# Patient Record
Sex: MALE | Race: WHITE | NOT HISPANIC OR LATINO | Employment: UNEMPLOYED | URBAN - METROPOLITAN AREA
[De-identification: names, ages, dates, MRNs, and addresses within clinical notes are randomized per-mention and may not be internally consistent; named-entity substitution may affect disease eponyms.]

---

## 2018-01-01 ENCOUNTER — HOSPITAL ENCOUNTER (INPATIENT)
Facility: HOSPITAL | Age: 28
LOS: 2 days | Discharge: NURSING, REHAB OR LONG-TERM CARE | DRG: 882 | End: 2018-01-03
Attending: PSYCHIATRY & NEUROLOGY | Admitting: PSYCHIATRY & NEUROLOGY
Payer: MEDICAID

## 2018-01-01 DIAGNOSIS — F33.2 SEVERE EPISODE OF RECURRENT MAJOR DEPRESSIVE DISORDER, WITHOUT PSYCHOTIC FEATURES: ICD-10-CM

## 2018-01-01 DIAGNOSIS — F41.1 GAD (GENERALIZED ANXIETY DISORDER): ICD-10-CM

## 2018-01-01 DIAGNOSIS — F11.20 OPIATE DEPENDENCE, CONTINUOUS: ICD-10-CM

## 2018-01-01 DIAGNOSIS — F43.23 ADJUSTMENT DISORDER WITH MIXED ANXIETY AND DEPRESSED MOOD: Primary | ICD-10-CM

## 2018-01-01 DIAGNOSIS — F32.A DEPRESSION: ICD-10-CM

## 2018-01-01 LAB
CHOLEST SERPL-MCNC: 170 MG/DL
CHOLEST/HDLC SERPL: 4.6 {RATIO}
HDLC SERPL-MCNC: 37 MG/DL
HDLC SERPL: 21.8 %
LDLC SERPL CALC-MCNC: 117.4 MG/DL
NONHDLC SERPL-MCNC: 133 MG/DL
TRIGL SERPL-MCNC: 78 MG/DL

## 2018-01-01 PROCEDURE — 36415 COLL VENOUS BLD VENIPUNCTURE: CPT

## 2018-01-01 PROCEDURE — 80061 LIPID PANEL: CPT

## 2018-01-01 PROCEDURE — 11400000 HC PSYCH PRIVATE ROOM

## 2018-01-01 PROCEDURE — 25000003 PHARM REV CODE 250: Performed by: PSYCHIATRY & NEUROLOGY

## 2018-01-01 PROCEDURE — S4991 NICOTINE PATCH NONLEGEND: HCPCS | Performed by: PSYCHIATRY & NEUROLOGY

## 2018-01-01 PROCEDURE — 99232 SBSQ HOSP IP/OBS MODERATE 35: CPT | Mod: ,,, | Performed by: FAMILY MEDICINE

## 2018-01-01 PROCEDURE — 90833 PSYTX W PT W E/M 30 MIN: CPT | Mod: AF,HB,, | Performed by: PSYCHIATRY & NEUROLOGY

## 2018-01-01 PROCEDURE — 99223 1ST HOSP IP/OBS HIGH 75: CPT | Mod: AF,HB,, | Performed by: PSYCHIATRY & NEUROLOGY

## 2018-01-01 RX ORDER — TRAMADOL HYDROCHLORIDE 50 MG/1
100 TABLET ORAL
Status: DISCONTINUED | OUTPATIENT
Start: 2018-01-01 | End: 2018-01-02

## 2018-01-01 RX ORDER — TRAMADOL HYDROCHLORIDE 50 MG/1
100 TABLET ORAL ONCE
Status: DISCONTINUED | OUTPATIENT
Start: 2018-01-01 | End: 2018-01-01

## 2018-01-01 RX ORDER — TRAMADOL HYDROCHLORIDE 50 MG/1
150 TABLET ORAL
Status: DISCONTINUED | OUTPATIENT
Start: 2018-01-01 | End: 2018-01-01

## 2018-01-01 RX ORDER — TRAMADOL HYDROCHLORIDE 50 MG/1
50 TABLET ORAL ONCE
Status: COMPLETED | OUTPATIENT
Start: 2018-01-01 | End: 2018-01-01

## 2018-01-01 RX ORDER — TRAMADOL HYDROCHLORIDE 50 MG/1
100 TABLET ORAL ONCE
Status: COMPLETED | OUTPATIENT
Start: 2018-01-01 | End: 2018-01-01

## 2018-01-01 RX ORDER — POTASSIUM CHLORIDE 20 MEQ/1
20 TABLET, EXTENDED RELEASE ORAL
Status: COMPLETED | OUTPATIENT
Start: 2018-01-01 | End: 2018-01-01

## 2018-01-01 RX ORDER — LOPERAMIDE HYDROCHLORIDE 2 MG/1
2 CAPSULE ORAL 4 TIMES DAILY PRN
Status: DISCONTINUED | OUTPATIENT
Start: 2018-01-01 | End: 2018-01-03 | Stop reason: HOSPADM

## 2018-01-01 RX ORDER — IBUPROFEN 400 MG/1
400 TABLET ORAL EVERY 6 HOURS PRN
Status: DISCONTINUED | OUTPATIENT
Start: 2018-01-01 | End: 2018-01-03 | Stop reason: HOSPADM

## 2018-01-01 RX ORDER — DIAZEPAM 10 MG/1
10 TABLET ORAL ONCE
Status: COMPLETED | OUTPATIENT
Start: 2018-01-01 | End: 2018-01-01

## 2018-01-01 RX ORDER — DIAZEPAM 10 MG/1
10 TABLET ORAL 3 TIMES DAILY
Status: DISCONTINUED | OUTPATIENT
Start: 2018-01-01 | End: 2018-01-02

## 2018-01-01 RX ORDER — ONDANSETRON 4 MG/1
4 TABLET, ORALLY DISINTEGRATING ORAL EVERY 6 HOURS PRN
Status: DISCONTINUED | OUTPATIENT
Start: 2018-01-01 | End: 2018-01-03 | Stop reason: HOSPADM

## 2018-01-01 RX ORDER — DIAZEPAM 10 MG/1
10 TABLET ORAL ONCE
Status: DISCONTINUED | OUTPATIENT
Start: 2018-01-01 | End: 2018-01-01

## 2018-01-01 RX ORDER — IBUPROFEN 200 MG
1 TABLET ORAL DAILY
Status: DISCONTINUED | OUTPATIENT
Start: 2018-01-01 | End: 2018-01-03 | Stop reason: HOSPADM

## 2018-01-01 RX ADMIN — TRAMADOL HYDROCHLORIDE 50 MG: 50 TABLET, FILM COATED ORAL at 05:01

## 2018-01-01 RX ADMIN — TRAMADOL HYDROCHLORIDE 100 MG: 50 TABLET, FILM COATED ORAL at 11:01

## 2018-01-01 RX ADMIN — TRAMADOL HYDROCHLORIDE 100 MG: 50 TABLET, FILM COATED ORAL at 04:01

## 2018-01-01 RX ADMIN — POTASSIUM CHLORIDE 20 MEQ: 1500 TABLET, EXTENDED RELEASE ORAL at 10:01

## 2018-01-01 RX ADMIN — DIAZEPAM 10 MG: 10 TABLET ORAL at 09:01

## 2018-01-01 RX ADMIN — TRAMADOL HYDROCHLORIDE 100 MG: 50 TABLET, FILM COATED ORAL at 07:01

## 2018-01-01 RX ADMIN — IBUPROFEN 400 MG: 400 TABLET, FILM COATED ORAL at 06:01

## 2018-01-01 RX ADMIN — DIAZEPAM 10 MG: 10 TABLET ORAL at 07:01

## 2018-01-01 RX ADMIN — ONDANSETRON 4 MG: 4 TABLET, ORALLY DISINTEGRATING ORAL at 05:01

## 2018-01-01 RX ADMIN — NICOTINE 1 PATCH: 14 PATCH, EXTENDED RELEASE TRANSDERMAL at 09:01

## 2018-01-01 RX ADMIN — DIAZEPAM 10 MG: 10 TABLET ORAL at 01:01

## 2018-01-01 RX ADMIN — ONDANSETRON 4 MG: 4 TABLET, ORALLY DISINTEGRATING ORAL at 10:01

## 2018-01-01 RX ADMIN — TRAMADOL HYDROCHLORIDE 100 MG: 50 TABLET, FILM COATED ORAL at 08:01
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
## 2018-01-01 NOTE — PLAN OF CARE
Problem: Patient Care Overview (Adult)  Goal: Plan of Care Review  Outcome: Ongoing (interventions implemented as appropriate)  Pt. Has slept 2 hours this shift without problems noted. q 15 min safety checks done this shift.

## 2018-01-01 NOTE — NURSING
Pt up to unit per stretcher, accompanied by security and [facility].  Wanded and ambulated onto unit
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
## 2018-01-01 NOTE — NURSING
Report received from Neeta at [facility].  Pt is 26 yo in on PEC for depression with suicidal ideations and trying to get off heroin.  Currently denies SI but is depressed and does not feel safe if he was discharged.  Acting anxious and suspicious.  Received ativan, zofran and clonidine earlier and is currently sleeping.  Awaiting transfer to unit

## 2018-01-01 NOTE — CONSULTS
History & Physical      SUBJECTIVE:     History of Present Illness:  Patient is a 27 y.o. male presents with substance abuse. Admitted to [facility].    No past medical history other than psych. No complaints today.    No prescriptions prior to admission.       Review of patient's allergies indicates:   Allergen Reactions    Ampicillin Hives    Penicillins Hives       Past Medical History:   Diagnosis Date    Addiction to drug     ADHD (attention deficit hyperactivity disorder)     History of psychiatric hospitalization     [facility] for [facility]hab    Substance abuse     Withdrawal symptoms, drug or narcotic      Past Surgical History:   Procedure Laterality Date    INNER EAR SURGERY       History reviewed. No pertinent family history.  Social History   Substance Use Topics    Smoking status: Current Every Day Smoker     Packs/day: 2.00     Years: 10.00     Types: Cigarettes    Smokeless tobacco: Never Used    Alcohol use No        Review of Systems:  Constitutional: no fever or chills  Respiratory: no cough or shorness of breath  Cardiovascular: no chest pain or palpitations  Gastrointestinal: no nausea or vomiting, no abdominal pain or change in bowel habits  Musculoskeletal: no arthralgias or myalgias  Psych:Opiate abuse  OBJECTIVE:     Vital Signs (Most Recent)  Temp: 99 °F (37.2 °C) (01/01/18 0109)  Pulse: 75 (01/01/18 0109)  Resp: 16 (01/01/18 0109)  BP: 118/64 (01/01/18 0109)    Physical Exam:  General: well developed, well nourished  Lungs:  clear to auscultation bilaterally and normal respiratory effort  Cardiovascular: Heart: regular rate and rhythm, S1, S2 normal, no murmur, click, rub or gallop. Chest Wall: no tenderness. Extremities: no cyanosis or edema, or clubbing. Pulses: 2+ and symmetric.  Abdomen/Rectal: Abdomen: soft, non-tender non-distented; bowel sounds normal; no masses,  no organomegaly. Rectal: not examined  Skin: Skin color, texture, turgor normal. No rashes or  lesions  Musculoskeletal:normal gait  Psych:Quiet  Neuro: Cranial nerves:  CN II Visual fields full to confrontation.   CN III, IV, VI Pupils are equal, round, and reactive to light.  CN III: no palsy  Nystagmus: none   Diplopia: none  Ophthalmoparesis: none  CN V Facial sensation intact.   CN VII Facial expression full, symmetric.   CN VIII normal.   CN IX normal.   CN X normal.   CN XI normal.   CN XII normal.        Laboratory  CBC:   Recent Labs  Lab 12/31/17  1845   WBC 13.88*   RBC 5.04   HGB 15.4   HCT 45.5   *   MCV 90   MCH 30.6   MCHC 33.8     CMP:   Recent Labs  Lab 12/31/17  1845      CALCIUM 10.3   ALBUMIN 5.4*   PROT 9.6*      K 3.3*   CO2 31*   CL 97   BUN 18   CREATININE 0.8   ALKPHOS 69   ALT 39   AST 28   BILITOT 0.8       Recent Labs  Lab 12/31/17  1852   COLORU Yellow   SPECGRAV 1.025   PHUR 6.0   PROTEINUA 1+*   BACTERIA None   NITRITE Negative   LEUKOCYTESUR Negative   UROBILINOGEN 4.0-6.0*   HYALINECASTS 1     TSH   Date Value Ref Range Status   12/31/2017 0.430 0.400 - 4.000 uIU/mL Final     No results found for this or any previous visit.  Alcohol, Medical, Serum   Date Value Ref Range Status   12/31/2017 <5 <10 mg/dL Final     Acetaminophen (Tylenol), Serum   Date Value Ref Range Status   12/31/2017 <10.0 (L) 10.0 - 20.0 ug/mL Final     Comment:     Toxic Levels:  Adults (4 hr post-ingestion).........>150 ug/mL  Adults (12 hr post-ingestion)........>40 ug/mL  Peds (2 hr post-ingestion, liquid)...>225 ug/mL       No results found for this or any previous visit.  Results for orders placed or performed during the hospital encounter of 12/31/17   Drug screen panel, emergency   Result Value Ref Range    Benzodiazepines Negative     Cocaine (Metab.) Negative     Opiate Scrn, Ur Presumptive Positive     Barbiturate Screen, Ur Negative     Amphetamine Screen, Ur Negative     THC Presumptive Positive     Phencyclidine Negative     Creatinine, Random Ur 307.8 23.0 - 375.0 mg/dL     Toxicology Information SEE COMMENT      No results found for: PREGTESTUR    ASSESSMENT/PLAN:     There are no hospital problems to display for this patient.      Plan: Further orders for psych

## 2018-01-01 NOTE — PLAN OF CARE
Problem: Patient Care Overview (Adult)  Goal: Plan of Care Review  Outcome: Ongoing (interventions implemented as appropriate)  Plan of care reviewed with patient, patient in agreement with plan. Denies suicidal ideations and homicidal ideations.  Accepts meals and snacks.  Compliant with medications.  Experiencing some detox symptoms, nausea, and some aches to arms and legs.  Gait steady, no falls.  Clear pathways and clutter-free environment maintained.  Promoted an individualized safety plan, effective coping skills, a drug-free lifestyle, and motivators to improve mood and resolve depression. Will continue to monitor for safety.

## 2018-01-01 NOTE — NURSING
"Pt restless, up to nurses station often complaining of pain, crying saying he wants to call his brother, wanting to get out and get suboxone.  Needs constant redirection.  Changing story saying that he was in group home for 4 years and did heroin the whole time he was there.  On admit said he was only in group home for 1-2 weeks.  Says he needs to get out bc his mom is having open heart surgery today.  Says he lied to get in here, his sister told him to say he was suicidal.  Says "I was never suicidal"  Received Tramadol 50mg po per order.  Pt took, but said it was not going to help.  "

## 2018-01-02 PROBLEM — F43.23 ADJUSTMENT DISORDER WITH MIXED ANXIETY AND DEPRESSED MOOD: Status: ACTIVE | Noted: 2018-01-01

## 2018-01-02 PROBLEM — F32.A DEPRESSION: Status: RESOLVED | Noted: 2018-01-01 | Resolved: 2018-01-02

## 2018-01-02 LAB
ALBUMIN SERPL BCP-MCNC: 4.8 G/DL
ALP SERPL-CCNC: 77 U/L
ALT SERPL W/O P-5'-P-CCNC: 29 U/L
ANION GAP SERPL CALC-SCNC: 11 MMOL/L
AST SERPL-CCNC: 18 U/L
BILIRUB SERPL-MCNC: 0.9 MG/DL
BUN SERPL-MCNC: 16 MG/DL
CALCIUM SERPL-MCNC: 9.9 MG/DL
CHLORIDE SERPL-SCNC: 104 MMOL/L
CO2 SERPL-SCNC: 26 MMOL/L
CREAT SERPL-MCNC: 1.1 MG/DL
EST. GFR  (AFRICAN AMERICAN): >60 ML/MIN/1.73 M^2
EST. GFR  (NON AFRICAN AMERICAN): >60 ML/MIN/1.73 M^2
GLUCOSE SERPL-MCNC: 121 MG/DL
GLUCOSE SERPL-MCNC: 121 MG/DL
POTASSIUM SERPL-SCNC: 3.6 MMOL/L
PROT SERPL-MCNC: 9 G/DL
SODIUM SERPL-SCNC: 141 MMOL/L

## 2018-01-02 PROCEDURE — 25000003 PHARM REV CODE 250: Performed by: PSYCHIATRY & NEUROLOGY

## 2018-01-02 PROCEDURE — 11400000 HC PSYCH PRIVATE ROOM

## 2018-01-02 PROCEDURE — 99233 SBSQ HOSP IP/OBS HIGH 50: CPT | Mod: AF,HB,, | Performed by: PSYCHIATRY & NEUROLOGY

## 2018-01-02 PROCEDURE — S4991 NICOTINE PATCH NONLEGEND: HCPCS | Performed by: PSYCHIATRY & NEUROLOGY

## 2018-01-02 PROCEDURE — 36415 COLL VENOUS BLD VENIPUNCTURE: CPT

## 2018-01-02 PROCEDURE — 80053 COMPREHEN METABOLIC PANEL: CPT

## 2018-01-02 PROCEDURE — 82947 ASSAY GLUCOSE BLOOD QUANT: CPT

## 2018-01-02 PROCEDURE — 97802 MEDICAL NUTRITION INDIV IN: CPT

## 2018-01-02 PROCEDURE — 90833 PSYTX W PT W E/M 30 MIN: CPT | Mod: AF,HB,, | Performed by: PSYCHIATRY & NEUROLOGY

## 2018-01-02 RX ORDER — TRAMADOL HYDROCHLORIDE 50 MG/1
50 TABLET ORAL 2 TIMES DAILY
Status: DISCONTINUED | OUTPATIENT
Start: 2018-01-02 | End: 2018-01-03 | Stop reason: HOSPADM

## 2018-01-02 RX ORDER — DIAZEPAM 5 MG/1
5 TABLET ORAL 2 TIMES DAILY
Status: DISCONTINUED | OUTPATIENT
Start: 2018-01-02 | End: 2018-01-03 | Stop reason: HOSPADM

## 2018-01-02 RX ORDER — IBUPROFEN 200 MG
1 TABLET ORAL DAILY
Refills: 0 | COMMUNITY
Start: 2018-01-03 | End: 2018-02-21 | Stop reason: ALTCHOICE

## 2018-01-02 RX ORDER — HYDROXYZINE PAMOATE 50 MG/1
50 CAPSULE ORAL EVERY 8 HOURS PRN
Status: DISCONTINUED | OUTPATIENT
Start: 2018-01-02 | End: 2018-01-03 | Stop reason: HOSPADM

## 2018-01-02 RX ADMIN — IBUPROFEN 400 MG: 400 TABLET, FILM COATED ORAL at 04:01

## 2018-01-02 RX ADMIN — DIAZEPAM 5 MG: 5 TABLET ORAL at 08:01

## 2018-01-02 RX ADMIN — HYDROXYZINE PAMOATE 50 MG: 50 CAPSULE ORAL at 12:01

## 2018-01-02 RX ADMIN — ONDANSETRON 4 MG: 4 TABLET, ORALLY DISINTEGRATING ORAL at 05:01

## 2018-01-02 RX ADMIN — TRAMADOL HYDROCHLORIDE 100 MG: 50 TABLET, FILM COATED ORAL at 08:01

## 2018-01-02 RX ADMIN — ONDANSETRON 4 MG: 4 TABLET, ORALLY DISINTEGRATING ORAL at 08:01

## 2018-01-02 RX ADMIN — DIAZEPAM 10 MG: 10 TABLET ORAL at 05:01

## 2018-01-02 RX ADMIN — NICOTINE 1 PATCH: 14 PATCH, EXTENDED RELEASE TRANSDERMAL at 08:01

## 2018-01-02 RX ADMIN — ONDANSETRON 4 MG: 4 TABLET, ORALLY DISINTEGRATING ORAL at 01:01

## 2018-01-02 RX ADMIN — IBUPROFEN 400 MG: 400 TABLET, FILM COATED ORAL at 05:01

## 2018-01-02 RX ADMIN — HYDROXYZINE PAMOATE 50 MG: 50 CAPSULE ORAL at 08:01

## 2018-01-02 RX ADMIN — TRAMADOL HYDROCHLORIDE 50 MG: 50 TABLET, FILM COATED ORAL at 08:01

## 2018-01-02 NOTE — PLAN OF CARE
Problem: Overarching Goals (Adult)  Goal: Optimized Coping Skills in Response to Life Stressors    Intervention: Promote Effective Coping Strategies  Group Note    Behavior:  Patient attended Psychotherapy Group late, but participated. Patient complained it was too cold in the room (it was) and his bones hurt.     Intervention:  My Support Map - discussed the importance of healthy relationships, identified different types of relationships and attributes of supportive relationships. Patients completed support map and safety plan.     Response:  Patient states he is going to rehab and has the support of his family. Patient acknowledged he doesn't have safe friends for support.     Plan:  Will continue to encourage patient participation in group.

## 2018-01-02 NOTE — PSYCH
I spoke with the patient about discharge plans. The patient expressed an interest in going into a recovery program. I shared with the patient that we could apply for entrance into a program if he would sign a consent form. The patient signed a consent form to release information to [facility] in [place], [place]. I also spoke to the patient's father who will bring clothing and other items to him during the 5:30 visiting time. The patient's father also stated that he would contact the patient's .

## 2018-01-02 NOTE — PLAN OF CARE
"Problem: Patient Care Overview (Adult)  Goal: Plan of Care Review  Outcome: Ongoing (interventions implemented as appropriate)  Pt is restless and anxious but cooperative.  Pt is discharge focused and manipulative.  Pt informed during treatment team that he would be scheduled to discharge tomorrow if his behavior was appropriate, pt verbalized understanding.  Pt denies any S/I or H/I and reports that he was never suicidal, "I just said that because my sister told me I had to say it to get treatment for detox".  Encouraged pt to continue complying with treatment to work toward discharging tomorrow.  Pt verbalized understanding, will continue to monitor.      "

## 2018-01-02 NOTE — PLAN OF CARE
"  Treatment Recommendation:   1:1 Intervention (as needed)    Cognitive Stimulation Skilled Activity  Creative Expression Skilled Activity  Self Expression Skilled Activity  Mild Exercises Skilled Activity  Stress Management Skilled Activity  Coping Skilled Activity  Leisure Education and Awareness Skilled Activity    Treatment Goal(s):  Long Term Goals Refer To Master Treatment Plan    Short Term Treatment Goal(s)  Patient Will:  Exhibit Improvement in Mood  Demonstrate Constructive Expression of Feelings and Behavior  Identify (+) Leisure / Recreation Activities that Promote Wellness and Promote a Sober Lifestyle    Discharge Recommendations:  Encourage Patient to Utilize Coping Skills on a Regular Basis to Reduce the Risk of Decompensating and Re-Hospitalizations  AA/NA Meetings  Follow Up with After Care Appointments  Continue with Current Leisure Activities     Patient presents with anxious affect and "not good, feeling depressed, my bones hurt" mood. Patient states his admit is due to "doing heroin. I made a mistake. I said I was suicidal but I just want to detox." Patient admits to negative leisure lifestyle of heroin, marijuana and cigarettes. Patient reports he is single, has no children, 11th grade special education(needs help with reading), unemployed, lives with his grandmother in [place], [place]. Patient verbalized main goal is to go to rehab for drug use.  "

## 2018-01-02 NOTE — PROGRESS NOTES
"PSYCHIATRY DAILY INPATIENT PROGRESS NOTE  SUBSEQUENT HOSPITAL VISIT    ENCOUNTER DATE: 1/2/2018  SITE: [facility]    DATE OF ADMISSION: 1/1/2018 12:31 AM  LENGTH OF STAY: 1 days      HISTORY    CHIEF COMPLAINT   Roland Rust is a 27 y.o. male, seen during daily marie rounds on the inpatient unit.  Roland Rust presents with the chief complaint of suicidal ideation and opiate addiction    HPI   (Elements: Location, Quality, Severity, Duration, Timing, Content, Modifying Factors, Associated Signs & Symptoms)    The patient was seen and examined. The chart was reviewed.    Staff reports no behavioral or management issues.     The patient has been compliant with treatment. The patient denied any side effects.    Improving Symptoms of Depression: less diminished mood or loss of interest/anhedonia; no irritability, no diminished energy, less change in sleep, no change in appetite, no diminished concentration or cognition or indecisiveness, no PMA/R, less excessive guilt or hopelessness or worthlessness, no suicidal ideations     Some changes in Sleep: no trouble with initiation, maintenance, or early morning awakening with inability to return to sleep; slept 6.5 hours     Denied Suicidal/Homicidal ideations: no active/passive ideations, organized plans, or future intentions; "I just said that so I could detox."     Denied Symptoms of psychosis: no hallucinations, delusions, disorganized thinking, disorganized behavior or abnormal motor behavior, or negative symptoms      Denies Symptoms of barry or hypomania: no elevated, expansive, or irritable mood with no increased energy or activity; with no inflated self-esteem or grandiosity, decreased need for sleep, increased rate of speech, FOI or racing thoughts, distractibility, increased goal directed activity or PMA,or  risky/disinhibited behavior     Improving Symptoms of CATHY: less excessive anxiety/worry/fear, with no restlessness, fatigue, poor " concentration, irritability, muscle tension, or sleep disturbance     Endorses Substance Use: all of the following intoxication, withdrawal, tolerance, used in larger amounts or duration than intended, unsuccessful attempts to limit or quit, increased time engaging in or seeking out, cravings or strong desire to use, failure to fulfill obligations, negative consequences in social/interpersonal/occupational,/recreational areas, use in dangerous situations, medical or psychological consequences; Uses 7 grams of heroin per day and significant amount of synthetic marijuana.  He uses the marijuana to sleep.      He reports resolved s/s of withdrawal. He is focused on going to rehab as soon as possible.     He was very focused on being discharged as soon as possible. There is concern that he may be minimizing symptoms to secure discharge.        PSYCHOTHERAPY ADD-ON +01973   30 (16-37*) minutes    Time: 16 minutes  Participants: Met with patient    Therapeutic Intervention Type: behavior modifying psychotherapy, supportive psychotherapy  Why chosen therapy is appropriate versus another modality: relevant to diagnosis, patient responds to this modality, evidence based practice    Target symptoms: depression, substance abuse  Primary focus: substance use  Psychotherapeutic techniques: supportive and motivational techniques; psycho-education    Outcome monitoring methods: self-report, observation    Patient's response to intervention:  The patient's response to intervention is accepting.    Progress toward goals:  The patient's progress toward goals is fair .          ROS  General ROS: negative  Ophthalmic ROS: negative  ENT ROS: negative  Allergy and Immunology ROS: negative  Hematological and Lymphatic ROS: negative  Endocrine ROS: negative  Respiratory ROS: no cough, shortness of breath, or wheezing  Cardiovascular ROS: no chest pain or dyspnea on exertion  Gastrointestinal ROS: no abdominal pain, change in bowel habits,  "or black or bloody stools  Genito-Urinary ROS: no dysuria, trouble voiding, or hematuria  Musculoskeletal ROS: negative  Neurological ROS: no TIA or stroke symptoms  Dermatological ROS: negative    PAST MEDICAL HISTORY   Past Medical History:   Diagnosis Date    Addiction to drug     ADHD (attention deficit hyperactivity disorder)     History of psychiatric hospitalization     Shanda Carmona-says for rehab    Substance abuse     Withdrawal symptoms, drug or narcotic            PSYCHOTROPIC MEDICATIONS   Scheduled Meds:   diazePAM  10 mg Oral TID    nicotine  1 patch Transdermal Daily    traMADol  100 mg Oral QID (WM & HS)     Continuous Infusions:  PRN Meds:.ibuprofen, loperamide, ondansetron        EXAMINATION    VITALS   Vitals:    01/01/18 2055   BP: 107/71   Pulse: 62   Resp: 16   Temp: 99.4 °F (37.4 °C)       CONSTITUTIONAL  General Appearance: Tattoos      MUSCULOSKELETAL  Muscle Strength and Tone:  normal  Abnormal Involuntary Movements:  none  Gait and Station:  normal; non-ataxic     PSYCHIATRIC   Level of Consciousness: awake, alert  Orientation: p/p/t/s  Grooming:  adequate to circumstances  Psychomotor Behavior:  Significant PMA/R  Speech: nl r/t/v/s  Language: able to repeat words English fluent  Mood: "better"  Affect: less dysphoric, improving range; improving   Thought Process:  linear and organized  Associations:  intact; no ELISA  Thought Content: no SI denied AVH/delusions; denied HI  Memory:  intact to recent and remote events  Attention:  intact to conversation; not distractible   Fund of Knowledge:  age and education appropriate  Estimate if Intelligence:  average based on work/education history, vocabulary and mental status exam  Insight:  good- seeks help  Judgment:  fair - demanding somewhat cooperative with treatment      DIAGNOSTIC TESTING   Laboratory Results  No results found for this or any previous visit (from the past 24 hour(s)).      ASSESSMENT      IMPRESSION   Major " Depression Disorder Recurrent, Severe, without psychotic features  CATHY  Opiate Use Disorder, severe, continuous, with physiological dependence   Marijuana  Abuse  Nicotine Dependence     MEDICAL DECISION MAKING        PROBLEM LIST AND MANAGEMENT PLANS   Depression - counseling, improving and likely secondary to substance use  Anxiety - counselled, improving and likely secondary to substance use  Opiate Use Disorder - counseling and Tramadol / valium taper  Marijuana Use - counseled  Nicotine Use - replacement and counseled  Hypokalemia - replacement and CMP     PRESCRIPTION DRUG MANAGEMENT  Compliance: yes  Side Effects: no  Regimen Adjustments:   1/1  Tramadol 100mg QID while awake  Valium 10mg TID   Both hold for sedation  Potassium solution    1/2  Decrease Tramadol to 50 mg po BID and Valium to 5 mg po BID     DIAGNOSTIC TESTING  Labs reviewed      Disposition:  -SW to assist with aftercare planning and collateral  -Once stable discharge home with outpatient follow up care and/or rehab  -Continue inpatient treatment under a PEC and/or CEC for danger to self as evidenced by voiced suicidal ideation in severe opiate withdrawal. Patient is improving and may be stable for discharge tomorrow    NEED FOR CONTINUED HOSPITALIZATION  Psychiatric illness continues to pose a potential threat to life or bodily function, of self or others, thereby requiring the need for continued inpatient psychiatric hospitalization: Yes    Protective inpatient pyschiatric hospitalization required while a safe disposition plan is enacted: Yes    Patient stabilized and ready for discharge from inpatient psychiatric unit: No      STAFF:   Magdaleno Jones MD  Psychiatry

## 2018-01-02 NOTE — PLAN OF CARE
Problem: Overarching Goals (Adult)  Goal: Develops/Participates in Therapeutic [place] to Support Successful Transition    Intervention: Mutually Develop Transition Plan  Attempted to contact patients grandmother Mary [phone]. Left voice mail message. Will attempt again later.

## 2018-01-02 NOTE — CONSULTS
"  [facility]  Adult Nutrition  Consult Note    SUMMARY     Recommendations    Recommendation/Intervention: Continue Regular diet as tolerated encouraging good intake  Goals: adequate po intake >=75% by discharge  Nutrition Goal Status: goal met  Communication of RD Recs:  (poc reviewed)    Nutrition Discharge Planning: Regular diet with adequate intake to meet EEN & EPN    Continuum of Care Plan    Referral to Outpatient Services: behavioral health clinic    Reason for Assessment    Reason for Assessment: nurse/nurse practitioner consult  Diagnosis: psychological disorder  Relevent Medical History: Psyc         General Information Comments: Pt admitted with depression, + drug use, marks on knuckles, decreased intake yesterday, today intake is good    Nutrition Prescription Ordered    Current Diet Order: Regular  Nutrition Order Comments: 75% intake      Evaluation of Received Nutrients/Fluid Intake    Energy Calories Required: meeting needs  Protein Required: meeting needs  Fluid Required: meeting needs     Tolerance: tolerating  % Intake of Estimated Energy Needs: 75 - 100 %  % Meal Intake: 75%     Nutrition Risk Screen     Nutrition Risk Screen: no indicators present    Nutrition/Diet History    Patient Reported Diet/Restrictions/Preferences: general     Food Preferences: no cultural or Faith preferences        Factors Affecting Nutritional Intake: depression, socio-economic    Labs/Tests/Procedures/Meds       Pertinent Labs Reviewed: reviewed     Pertinent Medications Reviewed: reviewed       Physical Findings    Overall Physical Appearance: nourished  Tubes:  (-)  Oral/Mouth Cavity: WDL  Skin:  (marks on knuckles)    Anthropometrics    Temp: 98 °F (36.7 °C)     Height: 5' 10" (177.8 cm)  Weight Method:  (rd reviewed)  Weight: 59.4 kg (131 lb)  Ideal Body Weight (IBW), Male: 166 lb     % Ideal Body Weight, Male (lb): 78.92 lb     BMI (Calculated): 18.8  BMI Grade: 18.5-24.9 - " normal    Estimated/Assessed Needs    Weight Used For Calorie Calculations: 59.4 kg (130 lb 15.3 oz)   Height (cm): 177.8 cm  Energy Calorie Requirements (kcal): 2054  Energy Need Method: Sublette-St Jeor (x1.3)     RMR (Sublette-St. Jeor Equation): 1580.25        Weight Used For Protein Calculations: 59.4 kg (130 lb 15.3 oz)  Protein Requirements: 59 (1.0)  1.0 gm Protein (gm): 59.52     Fluid Need Method: RDA Method (1ml/kcal or per MD)  RDA Method (mL): 2054      Assessment and Plan    Nutrition Diagnosis  No Nutrition Diagnosis at this time    Related to (etiology):   Adequate po intake    Signs and Symptoms (as evidenced by):   100% intake at this time     Nutrition Diagnosis Status:   New    Monitor and Evaluation    Food and Nutrient Intake: food and beverage intake  Food and Nutrient Adminstration: diet order  Knowledge/Beliefs/Attitudes: food and nutrition knowledge/skill, beliefs and attitudes  Physical Activity and Function: nutrition-related ADLs and IADLs  Anthropometric Measurements: weight, weight change  Biochemical Data, Medical Tests and Procedures: electrolyte and renal panel  Nutrition-Focused Physical Findings: overall appearance    Nutrition Risk    Level of Risk:  (F/U 1x/wk)    Nutrition Follow-Up    RD Follow-up?: Yes

## 2018-01-02 NOTE — PLAN OF CARE
Problem: Patient Care Overview (Adult)  Goal: Plan of Care Review  Outcome: Ongoing (interventions implemented as appropriate)  Pt has slept 6.5 hours so far uninterrupted.  NAD.  Resp even & unlabored.  Pathways clear and bed is low.  Q 15 minute safety checks ongoing.  All precautions maintained.

## 2018-01-02 NOTE — PLAN OF CARE
Problem: Patient Care Overview (Adult)  Goal: Plan of Care Review  Outcome: Ongoing (interventions implemented as appropriate)  Plan of care reviewed.  Denies intent to harm self or others at this time.  Accepts all meals and medications.  Gait steady, no falls. Somatic, interacts with staff to express self and with peers but on a superficial level. Firm limits were set by male co-worker and pt voiced understanding of such.  States he wants to go home and that he only stated that he was suicidal because his sister told him to do that to get rehab help.  Promoted an individualized safety plan, reality-based interactions, effective coping strategies, and impulse control.  Will continue to monitor for safety.

## 2018-01-02 NOTE — PLAN OF CARE
Problem: Patient Care Overview (Adult)  Goal: Interdisciplinary Rounds/Family Conf  TREATMENT TEAM UPDATE  Pos for opiates and thc         Chief Complaint:  Roland Rust is a 27 y.o. male with a past psychiatric history of Opiate Dependence, currently admitted to the inpatient unit with the following chief complaint: suicidal ideation and opiate addiction  Patient had a positive UTOX for opiates and thc     Current:  I was on heroin. I listened to my stupid sister and said I was suicidal.   Accepted to [facility]. States he wanted to detox and go to rehab.    I'm ready to go home. My grandma said I could detox at her house until calls and has a bed open for me. She's having open heart surgery.    I'm very not suicidal I love my life. Will stop detox meds today. And plan for discharge tomorrow. Will verify with grandmother to confirm and talk with  to help facilitate rehab acceptance. Will also make patient appointment with [facility]  States he last used 4-5 days ago.   Mary  grandmother.     Plan:  D/C to home   [facility]

## 2018-01-02 NOTE — PLAN OF CARE
Problem: Patient Care Overview (Adult)  Goal: Plan of Care Review  Outcome: Ongoing (interventions implemented as appropriate)  Nutrition Goals: adequate po intake >=75% by discharge  Nutrition Goal Status: goal met  Communication of RD Recs:  (poc reviewed)    Nutrition Discharge Planning: Regular diet with adequate intake to meet EEN & EPN    Continuum of Care Plan Referral to Outpatient Services: behavioral health clinic

## 2018-01-03 VITALS
HEIGHT: 70 IN | TEMPERATURE: 99 F | DIASTOLIC BLOOD PRESSURE: 85 MMHG | SYSTOLIC BLOOD PRESSURE: 125 MMHG | HEART RATE: 57 BPM | WEIGHT: 131 LBS | RESPIRATION RATE: 18 BRPM | BODY MASS INDEX: 18.75 KG/M2

## 2018-01-03 PROCEDURE — 25000003 PHARM REV CODE 250: Performed by: PSYCHIATRY & NEUROLOGY

## 2018-01-03 PROCEDURE — 99239 HOSP IP/OBS DSCHRG MGMT >30: CPT | Mod: AF,HB,, | Performed by: PSYCHIATRY & NEUROLOGY

## 2018-01-03 RX ADMIN — DIAZEPAM 5 MG: 5 TABLET ORAL at 08:01

## 2018-01-03 RX ADMIN — IBUPROFEN 400 MG: 400 TABLET, FILM COATED ORAL at 03:01

## 2018-01-03 RX ADMIN — TRAMADOL HYDROCHLORIDE 50 MG: 50 TABLET, FILM COATED ORAL at 08:01

## 2018-01-03 NOTE — NURSING
Pt is being discharged today to go to [facility] at [address] [place] [address] in [place] [address].  Phone number is .  Fax number is .  Pt is a current every day smoker and has received counseling on tobacco cessation while on U and will continue to receive counseling on smoking cessation at the next level of care which is [facility].  Pt will also receive counseling for drug addiction while at [facility].  Instructed pt that a nicotine patch is available over the counter at any pharmacy of choice to aid in smoking cessation.  No prescription is required.  Pt verbalized understanding.  Pt's belongings are packed and are in the nurses station  and will be sent with pt upon discharge.  Improved mood and affect.  Denies SI/HI and has maintained a reality based orientation while on U.  Copy of AVS has been faxed to [facility] at  on 1/3/18 at  0430 and will be sent with pt upon discharge.  Pt will be discharged this AM approximately at 0830.  [facility] Service will transport pt to [facility] upon pt's discharge as previously arranged.

## 2018-01-03 NOTE — DISCHARGE INSTRUCTIONS
You are being discharged on 1/3/17 to attend [facility] at [address] [place] [address] [place] [address].  The phone number is .  Fax number is .  Transportation will be provided by [facility] as previously arranged and you will leave here approximately at 0830 to travel to [place].  If you begin to have suicidal thoughts, call 911 or go to the nearest emergency room for help.  Good luck to you in your endeavor to stop taking illegal drugs.

## 2018-01-03 NOTE — PLAN OF CARE
Lying quiet in bed, eyes closed, respiration even and unlabored, appearing asleep.  Slept well up until about 0230.  Then came out of his room requesting pain med and was given ibuprofen at 0308.  While waiting for med to be pulled pt tried pushing limits about taking a shower.  Insisted that a shower was the only thing that would give him relief.  Encouraged him to follow the rules and to allow the ibuprofen to work instead of being pessimistic.  Pt did concede and went lie down.  Noted back to sleep by 0500.  Safety and precautions maintained with rounds every 15 minutes, bed is fixed in a low position and pathways kept clear.  No fall occurred.

## 2018-01-03 NOTE — PROGRESS NOTES
PSYCHOTHERAPY ADD-ON +[id number]   30 (16-37*) minutes     Time: 16 minutes  Participants: Met with patient     Therapeutic Intervention Type: behavior modifying psychotherapy, supportive psychotherapy  Why chosen therapy is appropriate versus another modality: relevant to diagnosis, patient responds to this modality, evidence based practice     Target symptoms: depression, substance abuse  Primary focus: substance use  Psychotherapeutic techniques: supportive and motivational techniques; psycho-education; managing cravings, safety planning     Outcome monitoring methods: self-report, observation     Patient's response to intervention:  The patient's response to intervention is accepting.     Progress toward goals:  The patient's progress toward goals is good.    Magdaleno Jones MD  Psychiatry

## 2018-01-03 NOTE — PSYCH
Patient will be following up with [facility] at [address] [place] [address] in [place], [phone].  Patient was picked up here by [facility] at 8:20 am.  He will receive tobacco cessation therapy and substance abuse therapy while in treatment due to a positive UDS on admit.  AVS faxed on 1/3/2018 at 9:11 am.
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
## 2018-02-23 PROBLEM — F22 PARANOID: Status: ACTIVE | Noted: 2018-02-23

## 2018-03-05 PROBLEM — F23 ACUTE PSYCHOSIS: Status: ACTIVE | Noted: 2018-03-05

## 2018-03-19 ENCOUNTER — HOSPITAL ENCOUNTER (INPATIENT)
Facility: HOSPITAL | Age: 28
LOS: 3 days | Discharge: HOME | DRG: 885 | End: 2018-03-22
Attending: PSYCHIATRY & NEUROLOGY | Admitting: PSYCHIATRY & NEUROLOGY
Payer: MEDICAID

## 2018-03-19 DIAGNOSIS — E87.6 HYPOKALEMIA: ICD-10-CM

## 2018-03-19 DIAGNOSIS — F17.210 CIGARETTE NICOTINE DEPENDENCE WITHOUT COMPLICATION: ICD-10-CM

## 2018-03-19 DIAGNOSIS — F29 PSYCHOSIS: ICD-10-CM

## 2018-03-19 DIAGNOSIS — F29 PSYCHOSIS, UNSPECIFIED PSYCHOSIS TYPE: ICD-10-CM

## 2018-03-19 DIAGNOSIS — F33.2 SEVERE EPISODE OF RECURRENT MAJOR DEPRESSIVE DISORDER, WITHOUT PSYCHOTIC FEATURES: Primary | ICD-10-CM

## 2018-03-19 PROCEDURE — 11400000 HC PSYCH PRIVATE ROOM

## 2018-03-20 PROBLEM — F17.210 CIGARETTE NICOTINE DEPENDENCE WITHOUT COMPLICATION: Status: ACTIVE | Noted: 2018-03-20

## 2018-03-20 PROBLEM — E87.6 HYPOKALEMIA: Status: ACTIVE | Noted: 2018-03-20

## 2018-03-20 PROBLEM — F29 PSYCHOSIS: Status: ACTIVE | Noted: 2018-03-20

## 2018-03-20 LAB — GLUCOSE SERPL-MCNC: 99 MG/DL

## 2018-03-20 PROCEDURE — 25000003 PHARM REV CODE 250: Performed by: NURSE PRACTITIONER

## 2018-03-20 PROCEDURE — 97802 MEDICAL NUTRITION INDIV IN: CPT

## 2018-03-20 PROCEDURE — 11400000 HC PSYCH PRIVATE ROOM

## 2018-03-20 PROCEDURE — 82947 ASSAY GLUCOSE BLOOD QUANT: CPT

## 2018-03-20 PROCEDURE — 25000003 PHARM REV CODE 250: Performed by: PSYCHIATRY & NEUROLOGY

## 2018-03-20 PROCEDURE — 99222 1ST HOSP IP/OBS MODERATE 55: CPT | Mod: ,,, | Performed by: NURSE PRACTITIONER

## 2018-03-20 PROCEDURE — 36415 COLL VENOUS BLD VENIPUNCTURE: CPT

## 2018-03-20 RX ORDER — OLANZAPINE 10 MG/2ML
10 INJECTION, POWDER, FOR SOLUTION INTRAMUSCULAR EVERY 4 HOURS PRN
Status: DISCONTINUED | OUTPATIENT
Start: 2018-03-20 | End: 2018-03-20

## 2018-03-20 RX ORDER — FOLIC ACID 1 MG/1
1 TABLET ORAL DAILY
Status: DISCONTINUED | OUTPATIENT
Start: 2018-03-20 | End: 2018-03-22 | Stop reason: HOSPADM

## 2018-03-20 RX ORDER — IBUPROFEN 200 MG
1 TABLET ORAL DAILY PRN
Status: DISCONTINUED | OUTPATIENT
Start: 2018-03-20 | End: 2018-03-22 | Stop reason: HOSPADM

## 2018-03-20 RX ORDER — ACETAMINOPHEN 325 MG/1
650 TABLET ORAL EVERY 6 HOURS PRN
Status: DISCONTINUED | OUTPATIENT
Start: 2018-03-20 | End: 2018-03-22 | Stop reason: HOSPADM

## 2018-03-20 RX ORDER — IBUPROFEN 200 MG
1 TABLET ORAL DAILY
Status: DISCONTINUED | OUTPATIENT
Start: 2018-03-20 | End: 2018-03-20

## 2018-03-20 RX ORDER — OLANZAPINE 10 MG/1
10 TABLET ORAL EVERY 4 HOURS PRN
Status: DISCONTINUED | OUTPATIENT
Start: 2018-03-20 | End: 2018-03-20

## 2018-03-20 RX ORDER — FOLIC ACID 1 MG/1
1 TABLET ORAL DAILY
Status: DISCONTINUED | OUTPATIENT
Start: 2018-03-20 | End: 2018-03-20

## 2018-03-20 RX ORDER — HYDROXYZINE PAMOATE 50 MG/1
50 CAPSULE ORAL EVERY 6 HOURS PRN
Status: DISCONTINUED | OUTPATIENT
Start: 2018-03-20 | End: 2018-03-22 | Stop reason: HOSPADM

## 2018-03-20 RX ORDER — QUETIAPINE FUMARATE 200 MG/1
200 TABLET, FILM COATED ORAL DAILY
Status: DISCONTINUED | OUTPATIENT
Start: 2018-03-20 | End: 2018-03-22 | Stop reason: HOSPADM

## 2018-03-20 RX ORDER — OLANZAPINE 10 MG/1
10 TABLET ORAL EVERY 4 HOURS PRN
Status: DISCONTINUED | OUTPATIENT
Start: 2018-03-20 | End: 2018-03-22 | Stop reason: HOSPADM

## 2018-03-20 RX ORDER — POTASSIUM CHLORIDE 20 MEQ/1
40 TABLET, EXTENDED RELEASE ORAL ONCE
Status: COMPLETED | OUTPATIENT
Start: 2018-03-20 | End: 2018-03-20

## 2018-03-20 RX ORDER — IBUPROFEN 600 MG/1
600 TABLET ORAL EVERY 6 HOURS PRN
Status: DISCONTINUED | OUTPATIENT
Start: 2018-03-20 | End: 2018-03-22 | Stop reason: HOSPADM

## 2018-03-20 RX ORDER — IBUPROFEN 200 MG
1 TABLET ORAL DAILY PRN
Status: DISCONTINUED | OUTPATIENT
Start: 2018-03-20 | End: 2018-03-20

## 2018-03-20 RX ORDER — OLANZAPINE 10 MG/2ML
10 INJECTION, POWDER, FOR SOLUTION INTRAMUSCULAR EVERY 4 HOURS PRN
Status: DISCONTINUED | OUTPATIENT
Start: 2018-03-20 | End: 2018-03-22 | Stop reason: HOSPADM

## 2018-03-20 RX ORDER — IBUPROFEN 400 MG/1
400 TABLET ORAL EVERY 6 HOURS PRN
Status: DISCONTINUED | OUTPATIENT
Start: 2018-03-20 | End: 2018-03-20

## 2018-03-20 RX ORDER — QUETIAPINE FUMARATE 300 MG/1
300 TABLET, FILM COATED ORAL NIGHTLY
Status: DISCONTINUED | OUTPATIENT
Start: 2018-03-20 | End: 2018-03-21

## 2018-03-20 RX ADMIN — FOLIC ACID 1 MG: 1 TABLET ORAL at 08:03

## 2018-03-20 RX ADMIN — ACETAMINOPHEN 650 MG: 325 TABLET, FILM COATED ORAL at 05:03

## 2018-03-20 RX ADMIN — POTASSIUM CHLORIDE 40 MEQ: 1500 TABLET, EXTENDED RELEASE ORAL at 09:03

## 2018-03-20 RX ADMIN — IBUPROFEN 600 MG: 600 TABLET, FILM COATED ORAL at 09:03

## 2018-03-20 RX ADMIN — THERA TABS 1 TABLET: TAB at 08:03

## 2018-03-20 RX ADMIN — IBUPROFEN 600 MG: 600 TABLET, FILM COATED ORAL at 10:03

## 2018-03-20 RX ADMIN — QUETIAPINE FUMARATE 300 MG: 300 TABLET, FILM COATED ORAL at 08:03

## 2018-03-20 RX ADMIN — OLANZAPINE 10 MG: 10 TABLET, FILM COATED ORAL at 09:03

## 2018-03-20 RX ADMIN — QUETIAPINE FUMARATE 200 MG: 200 TABLET ORAL at 09:03

## 2018-03-20 NOTE — CONSULTS
[facility] Medicine  Consult Note    Patient Name: Roland Rust  MRN: [id number]  Admission Date: 3/19/2018  Hospital Length of Stay: 1 days  Attending Physician: Magdaleno Estrada MD   Primary Care Provider: Primary Doctor No           Patient information was obtained from patient and ER records.     Inpatient consult to Family Medicine  Consult performed by: FLORA LUNDBERG  Consult ordered by: MAGDALENO ESTRADA        Subjective:     Principal Problem: <principal problem not specified>    Chief Complaint: No chief complaint on file.       HPI:   28 year old male in [facility] ER brought in by police; patient reports that he is sick and tired of being sick and tired; doesn't want to live like this anymore; he reported wanting help for drugs and sponsor recommending him needing rehab.   BP Readings from Last 3 Encounters:   03/20/18 (!) 129/90   03/19/18 132/64   03/10/18 120/66     He denies hx of HTN, Diabetes    Past Medical History:   Diagnosis Date    Acute psychosis 3/5/2018    Addiction to drug     ADHD (attention deficit hyperactivity disorder)     Anxiety     Depression     History of psychiatric hospitalization     Shanda Mathew-says for rehab    Substance abuse     Withdrawal symptoms, drug or narcotic        Past Surgical History:   Procedure Laterality Date    INNER EAR SURGERY         Review of patient's allergies indicates:   Allergen Reactions    Ampicillin Hives    Penicillins Hives       No current facility-administered medications on file prior to encounter.      Current Outpatient Prescriptions on File Prior to Encounter   Medication Sig    nicotine (NICODERM CQ) 21 mg/24 hr Place 1 patch onto the skin once daily.    QUEtiapine (SEROQUEL) 200 MG Tab Take 1 tablet (200 mg total) by mouth once daily.    QUEtiapine (SEROQUEL) 300 MG Tab Take 1 tablet (300 mg total) by mouth every evening.     Family History     Problem Relation (Age of  Onset)    No Known Problems Mother, Father, Brother        Social History Main Topics    Smoking status: Current Every Day Smoker     Packs/day: 1.00     Years: 10.00     Types: Cigarettes     Start date: 1/28/2009    Smokeless tobacco: Never Used    Alcohol use No    Drug use: Yes     Types: Heroin, Methamphetamines      Comment: hx heroin    Sexual activity: Not Currently     Partners: Female     Birth control/ protection: Condom     Review of Systems   Constitutional: Negative for chills and fever.   HENT: Negative for congestion and sore throat.    Respiratory: Negative for cough, chest tightness and shortness of breath.    Cardiovascular: Negative for chest pain, palpitations and leg swelling.   Gastrointestinal: Negative for abdominal pain, diarrhea, nausea and vomiting.   Genitourinary: Negative for flank pain, frequency and hematuria.   Musculoskeletal: Negative for back pain and neck pain.   Skin: Negative for rash and wound.   Neurological: Negative for dizziness, seizures, syncope and headaches.   Psychiatric/Behavioral: Negative for agitation, confusion and self-injury.     Objective:     Vital Signs (Most Recent):  Temp: 97.1 °F (36.2 °C) (03/20/18 0000)  Pulse: 75 (03/20/18 0000)  Resp: 18 (03/20/18 0000)  BP: (!) 129/90 (03/20/18 0000) Vital Signs (24h Range):  Temp:  [97.1 °F (36.2 °C)-99 °F (37.2 °C)] 97.1 °F (36.2 °C)  Pulse:  [] 75  Resp:  [18-20] 18  SpO2:  [100 %] 100 %  BP: (129-140)/(64-90) 129/90     Weight: 59 kg (130 lb)  Body mass index is 19.77 kg/m².    Physical Exam   Constitutional: He is oriented to person, place, and time. He appears well-developed and well-nourished. No distress.   HENT:   Head: Normocephalic and atraumatic.   Eyes: Pupils are equal, round, and reactive to light.   Neck: No thyromegaly present.   Cardiovascular: Normal rate, regular rhythm, normal heart sounds and intact distal pulses.    No murmur heard.  Pulmonary/Chest: Effort normal and breath sounds  normal. No respiratory distress. He has no wheezes. He has no rales.   Abdominal: Soft. Bowel sounds are normal. He exhibits no distension and no mass. There is no tenderness.   Musculoskeletal: Normal range of motion. He exhibits no edema or deformity.   Lymphadenopathy:     He has no cervical adenopathy.   Neurological: He is alert and oriented to person, place, and time.   CN II visual fields full to confrontation  CN III, Iv, VI- pupils ERRL   CN III- no palsy  Nystagmus; none   Diplopia- none  ophthalmoparesis- none  CN V- facial sensation intact  CN VII- facial expression full and symmetric  CNVIII- normal  CN IV-Normal  CN X- normal  CN XI- Normal   CN XII normal      Skin: Skin is warm and dry. No rash noted. No erythema.   multiple neck and face tattoos   Psychiatric:   Appears fidgety. cooperative but a little agitated this am   Nursing note and vitals reviewed.      Significant Labs:   UPT  No results found for this or any previous visit.  U/A  Results for orders placed or performed during the hospital encounter of 03/19/18   Urinalysis   Result Value Ref Range    Specimen UA Urine, Clean Catch     Color, UA Yellow Yellow, Straw, Gretchen    Appearance, UA Cloudy (A) Clear    pH, UA 6.0 5.0 - 8.0    Specific Gravity, UA 1.020 1.005 - 1.030    Protein, UA Negative Negative    Glucose, UA Negative Negative    Ketones, UA Negative Negative    Bilirubin (UA) Negative Negative    Occult Blood UA Negative Negative    Nitrite, UA Negative Negative    Urobilinogen, UA Negative <2.0 EU/dL    Leukocytes, UA Negative Negative     UDS- + amphatamines     CBC  Results for orders placed or performed during the hospital encounter of 03/19/18   CBC auto differential   Result Value Ref Range    WBC 10.51 3.90 - 12.70 K/uL    RBC 4.51 (L) 4.60 - 6.20 M/uL    Hemoglobin 13.9 (L) 14.0 - 18.0 g/dL    Hematocrit 40.8 40.0 - 54.0 %    MCV 91 82 - 98 fL    MCH 30.8 27.0 - 31.0 pg    MCHC 34.1 32.0 - 36.0 g/dL    RDW 12.4 11.5 -  14.5 %    Platelets 364 (H) 150 - 350 K/uL    MPV 11.6 9.2 - 12.9 fL    Gran # (ANC) 6.4 1.8 - 7.7 K/uL    Lymph # 3.1 1.0 - 4.8 K/uL    Mono # 0.8 0.3 - 1.0 K/uL    Eos # 0.1 0.0 - 0.5 K/uL    Baso # 0.11 0.00 - 0.20 K/uL    nRBC 0 0 /100 WBC    Gran% 61.0 38.0 - 73.0 %    Lymph% 29.8 18.0 - 48.0 %    Mono% 7.3 4.0 - 15.0 %    Eosinophil% 0.9 0.0 - 8.0 %    Basophil% 1.0 0.0 - 1.9 %    Differential Method Automated      CMP  Results for orders placed or performed during the hospital encounter of 03/19/18   Comprehensive metabolic panel   Result Value Ref Range    Sodium 141 136 - 145 mmol/L    Potassium 3.4 (L) 3.5 - 5.1 mmol/L    Chloride 100 95 - 110 mmol/L    CO2 27 23 - 29 mmol/L    Glucose 72 70 - 110 mg/dL    BUN, Bld 16 6 - 20 mg/dL    Creatinine 0.9 0.5 - 1.4 mg/dL    Calcium 9.9 8.7 - 10.5 mg/dL    Total Protein 8.7 (H) 6.0 - 8.4 g/dL    Albumin 4.8 3.5 - 5.2 g/dL    Total Bilirubin 0.5 0.1 - 1.0 mg/dL    Alkaline Phosphatase 76 55 - 135 U/L    AST 57 (H) 10 - 40 U/L     (H) 10 - 44 U/L    Anion Gap 14 8 - 16 mmol/L    eGFR if African American >60.0 >60 mL/min/1.73 m^2    eGFR if non African American >60.0 >60 mL/min/1.73 m^2     TSH  Results for orders placed or performed during the hospital encounter of 03/05/18   TSH   Result Value Ref Range    TSH 0.690 0.400 - 4.000 uIU/mL     ETOH  Results for orders placed or performed during the hospital encounter of 03/05/18   Ethanol   Result Value Ref Range    Alcohol, Medical, Serum <5 <10 mg/dL     Salicylate  No results found for this or any previous visit.  Acetaminophen  Results for orders placed or performed during the hospital encounter of 03/05/18   Acetaminophen level   Result Value Ref Range    Acetaminophen (Tylenol), Serum <10.0 (L) 10.0 - 20.0 ug/mL             Significant Imaging: CT: I have reviewed all pertinent results/findings within the past 24 hours and my personal findings are:  No imaging    Assessment/Plan:     Cigarette nicotine  dependence without complication      Nicotine patch        Hypokalemia    KCL 40meq po x 1 today with food           Psychosis      Per psychiatry          VTE Risk Mitigation     None              Thank you for your consult. I will sign off. Please contact us if you have any additional questions.    Carolynn Oliva NP  Department of Hospital Medicine   [facility]
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
## 2018-03-20 NOTE — HPI
28 year old male in [facility] ER brought in by police; patient reports that he is sick and tired of being sick and tired; doesn't want to live like this anymore; he reported wanting help for drugs and sponsor recommending him needing rehab.   BP Readings from Last 3 Encounters:   03/20/18 (!) 129/90   03/19/18 132/64   03/10/18 120/66     He denies hx of HTN, Diabetes

## 2018-03-20 NOTE — PLAN OF CARE
Problem: Patient Care Overview (Adult)  Goal: Plan of Care Review  Recommendations     Recommendation/Intervention: Cont w/ current diet order & encourage adequate meal/snack intake daily.  Goals: Maintain meal intake >/=75%  Nutrition Goal Status: new
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
## 2018-03-20 NOTE — PLAN OF CARE
Problem: Patient Care Overview (Adult)  Goal: Plan of Care Review  Outcome: Ongoing (interventions implemented as appropriate)  Pt in bed majority of shift.Up for some meals.Hygiene/grooming poor.Irritable this am.Recieved prn Zyprexa 10mg po for agitation.Pt calmer since.Taking all medications.

## 2018-03-20 NOTE — PLAN OF CARE
Problem: Patient Care Overview (Adult)  Goal: Plan of Care Review  Outcome: Ongoing (interventions implemented as appropriate)  Plan of care developed and reviewed with patient, patient agrees with plan. Denies suicidal ideations, but feels homicidal when someone makes him angry, but he has no method nor plan in mind.  Accepted meal this evening, reports is taking medication at home.  Gait steady, no falls.  Restless, anxious and depressed. Clear pathways and clutter-free environment maintained.  Promoted an individualized safety plan, effective coping skills, a drug-free lifestyle, and motivators to improve mood and resolve depression and anxiety.  Will continue to monitor every fifteen minutes for safety.
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
## 2018-03-20 NOTE — PLAN OF CARE
Problem: Overarching Goals (Adult)  Goal: Optimized Coping Skills in Response to Life Stressors    Intervention: Promote Effective Coping Strategies  Patient did not attend Psychotherapy Group despite staff encouragement. Patient isolating in room in bed. Will attempt to meet 1:1 with patient later.

## 2018-03-20 NOTE — PLAN OF CARE
Problem: Patient Care Overview (Adult)  Goal: Plan of Care Review  Outcome: Ongoing (interventions implemented as appropriate)  Pt has slept 4 hours so far.  NAD.  Resp even & unlabored.  Pathways clear and bed is low.  Q 15 minute safety checks ongoing.  All precautions maintained.
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
## 2018-03-20 NOTE — SUBJECTIVE & OBJECTIVE
Past Medical History:   Diagnosis Date    Acute psychosis 3/5/2018    Addiction to drug     ADHD (attention deficit hyperactivity disorder)     Anxiety     Depression     History of psychiatric hospitalization     [facility]-says for rehab    Substance abuse     Withdrawal symptoms, drug or narcotic        Past Surgical History:   Procedure Laterality Date    INNER EAR SURGERY         Review of patient's allergies indicates:   Allergen Reactions    Ampicillin Hives    Penicillins Hives       No current facility-administered medications on file prior to encounter.      Current Outpatient Prescriptions on File Prior to Encounter   Medication Sig    nicotine (NICODERM CQ) 21 mg/24 hr Place 1 patch onto the skin once daily.    QUEtiapine (SEROQUEL) 200 MG Tab Take 1 tablet (200 mg total) by mouth once daily.    QUEtiapine (SEROQUEL) 300 MG Tab Take 1 tablet (300 mg total) by mouth every evening.     Family History     Problem Relation (Age of Onset)    No Known Problems Mother, Father, Brother        Social History Main Topics    Smoking status: Current Every Day Smoker     Packs/day: 1.00     Years: 10.00     Types: Cigarettes     Start date: 1/28/2009    Smokeless tobacco: Never Used    Alcohol use No    Drug use: Yes     Types: Heroin, Methamphetamines      Comment: hx heroin    Sexual activity: Not Currently     Partners: Female     Birth control/ protection: Condom     Review of Systems   Constitutional: Negative for chills and fever.   HENT: Negative for congestion and sore throat.    Respiratory: Negative for cough, chest tightness and shortness of breath.    Cardiovascular: Negative for chest pain, palpitations and leg swelling.   Gastrointestinal: Negative for abdominal pain, diarrhea, nausea and vomiting.   Genitourinary: Negative for flank pain, frequency and hematuria.   Musculoskeletal: Negative for back pain and neck pain.   Skin: Negative for rash and wound.   Neurological:  Negative for dizziness, seizures, syncope and headaches.   Psychiatric/Behavioral: Negative for agitation, confusion and self-injury.     Objective:     Vital Signs (Most Recent):  Temp: 97.1 °F (36.2 °C) (03/20/18 0000)  Pulse: 75 (03/20/18 0000)  Resp: 18 (03/20/18 0000)  BP: (!) 129/90 (03/20/18 0000) Vital Signs (24h Range):  Temp:  [97.1 °F (36.2 °C)-99 °F (37.2 °C)] 97.1 °F (36.2 °C)  Pulse:  [] 75  Resp:  [18-20] 18  SpO2:  [100 %] 100 %  BP: (129-140)/(64-90) 129/90     Weight: 59 kg (130 lb)  Body mass index is 19.77 kg/m².    Physical Exam   Constitutional: He is oriented to person, place, and time. He appears well-developed and well-nourished. No distress.   HENT:   Head: Normocephalic and atraumatic.   Eyes: Pupils are equal, round, and reactive to light.   Neck: No thyromegaly present.   Cardiovascular: Normal rate, regular rhythm, normal heart sounds and intact distal pulses.    No murmur heard.  Pulmonary/Chest: Effort normal and breath sounds normal. No respiratory distress. He has no wheezes. He has no rales.   Abdominal: Soft. Bowel sounds are normal. He exhibits no distension and no mass. There is no tenderness.   Musculoskeletal: Normal range of motion. He exhibits no edema or deformity.   Lymphadenopathy:     He has no cervical adenopathy.   Neurological: He is alert and oriented to person, place, and time.   CN II visual fields full to confrontation  CN III, Iv, VI- pupils ERRL   CN III- no palsy  Nystagmus; none   Diplopia- none  ophthalmoparesis- none  CN V- facial sensation intact  CN VII- facial expression full and symmetric  CNVIII- normal  CN IV-Normal  CN X- normal  CN XI- Normal   CN XII normal      Skin: Skin is warm and dry. No rash noted. No erythema.   multiple neck and face tattoos   Psychiatric:   Appears fidgety. cooperative but a little agitated this am   Nursing note and vitals reviewed.      Significant Labs:   UPT  No results found for this or any previous  visit.  U/A  Results for orders placed or performed during the hospital encounter of 03/19/18   Urinalysis   Result Value Ref Range    Specimen UA Urine, Clean Catch     Color, UA Yellow Yellow, Straw, Gretchen    Appearance, UA Cloudy (A) Clear    pH, UA 6.0 5.0 - 8.0    Specific Gravity, UA 1.020 1.005 - 1.030    Protein, UA Negative Negative    Glucose, UA Negative Negative    Ketones, UA Negative Negative    Bilirubin (UA) Negative Negative    Occult Blood UA Negative Negative    Nitrite, UA Negative Negative    Urobilinogen, UA Negative <2.0 EU/dL    Leukocytes, UA Negative Negative     UDS- + amphatamines     CBC  Results for orders placed or performed during the hospital encounter of 03/19/18   CBC auto differential   Result Value Ref Range    WBC 10.51 3.90 - 12.70 K/uL    RBC 4.51 (L) 4.60 - 6.20 M/uL    Hemoglobin 13.9 (L) 14.0 - 18.0 g/dL    Hematocrit 40.8 40.0 - 54.0 %    MCV 91 82 - 98 fL    MCH 30.8 27.0 - 31.0 pg    MCHC 34.1 32.0 - 36.0 g/dL    RDW 12.4 11.5 - 14.5 %    Platelets 364 (H) 150 - 350 K/uL    MPV 11.6 9.2 - 12.9 fL    Gran # (ANC) 6.4 1.8 - 7.7 K/uL    Lymph # 3.1 1.0 - 4.8 K/uL    Mono # 0.8 0.3 - 1.0 K/uL    Eos # 0.1 0.0 - 0.5 K/uL    Baso # 0.11 0.00 - 0.20 K/uL    nRBC 0 0 /100 WBC    Gran% 61.0 38.0 - 73.0 %    Lymph% 29.8 18.0 - 48.0 %    Mono% 7.3 4.0 - 15.0 %    Eosinophil% 0.9 0.0 - 8.0 %    Basophil% 1.0 0.0 - 1.9 %    Differential Method Automated      CMP  Results for orders placed or performed during the hospital encounter of 03/19/18   Comprehensive metabolic panel   Result Value Ref Range    Sodium 141 136 - 145 mmol/L    Potassium 3.4 (L) 3.5 - 5.1 mmol/L    Chloride 100 95 - 110 mmol/L    CO2 27 23 - 29 mmol/L    Glucose 72 70 - 110 mg/dL    BUN, Bld 16 6 - 20 mg/dL    Creatinine 0.9 0.5 - 1.4 mg/dL    Calcium 9.9 8.7 - 10.5 mg/dL    Total Protein 8.7 (H) 6.0 - 8.4 g/dL    Albumin 4.8 3.5 - 5.2 g/dL    Total Bilirubin 0.5 0.1 - 1.0 mg/dL    Alkaline Phosphatase 76 55 -  135 U/L    AST 57 (H) 10 - 40 U/L     (H) 10 - 44 U/L    Anion Gap 14 8 - 16 mmol/L    eGFR if African American >60.0 >60 mL/min/1.73 m^2    eGFR if non African American >60.0 >60 mL/min/1.73 m^2     TSH  Results for orders placed or performed during the hospital encounter of 03/05/18   TSH   Result Value Ref Range    TSH 0.690 0.400 - 4.000 uIU/mL     ETOH  Results for orders placed or performed during the hospital encounter of 03/05/18   Ethanol   Result Value Ref Range    Alcohol, Medical, Serum <5 <10 mg/dL     Salicylate  No results found for this or any previous visit.  Acetaminophen  Results for orders placed or performed during the hospital encounter of 03/05/18   Acetaminophen level   Result Value Ref Range    Acetaminophen (Tylenol), Serum <10.0 (L) 10.0 - 20.0 ug/mL             Significant Imaging: CT: I have reviewed all pertinent results/findings within the past 24 hours and my personal findings are:  No imaging

## 2018-03-20 NOTE — CONSULTS
"  [facility]  Adult Nutrition  Consult Note    SUMMARY     Recommendations    Recommendation/Intervention: Cont w/ current diet order & encourage adequate meal/snack intake daily.  Goals: Maintain meal intake >/=75%  Nutrition Goal Status: new  Communication of RD Recs:  (POC)    Reason for Assessment    Reason for Assessment: consult  Diagnosis: psychological disorder  Relevant Medical History: opiate use, depression, anxiety, adjustment disorder  General Information Comments: Per RN documentation, pt w/ adequate meal/snack intake; no nutr issues reported.  Nutrition Discharge Planning: Adequate PO intake to meet EEN/EPN    Nutrition Risk Screen    Nutrition Risk Screen: no indicators present    Nutrition/Diet History    Food Preferences: none reported  Do you have any cultural, spiritual, Sikh conflicts, given your current situation?: Sikh  Food Allergies: NKFA    Anthropometrics    Temp: 96.2 °F (35.7 °C)  Height Method: Stated  Height: 5' 8" (172.7 cm)  Height (inches): 68 in  Weight Method: Standard Scale  Weight: 59 kg (130 lb 1.1 oz)  Weight (lb): 130.07 lb  Ideal Body Weight (IBW), Male: 154 lb  % Ideal Body Weight, Male (lb): 84.46 lb  BMI (Calculated): 19.8  BMI Grade: 18.5-24.9 - normal    Lab/Procedures/Meds    Pertinent Labs Reviewed: reviewed  Pertinent Labs Comments: Alb 4.8, K 3.4  Pertinent Medications Reviewed: reviewed  Pertinent Medications Comments: KCl, folic acid, MVI    Physical Findings/Assessment    Overall Physical Appearance: nourished  Skin: intact    Estimated/Assessed Needs    Weight Used For Calorie Calculations: 59 kg (130 lb 1.1 oz)  Height: 5' 8" (172.7 cm)  Energy Calorie Requirements (kcal): 2001  Energy Need Method: [place]-St Jeor (x 1.3 (PAL))  RMR ([place]-St. Jeor Equation): 1539.5    Protein Requirements: 59 gms (1gm/kg)  Weight Used For Protein Calculations: 59 kg (130 lb 1.1 oz)    Fluid Need Method: RDA Method  RDA Method (mL): 2001 "       Nutrition Prescription Ordered    Current Diet Order: Regular    Evaluation of Received Nutrient/Fluid Intake    Energy Calories Required: meeting needs  Protein Required: meeting needs  Fluid Required: meeting needs  Tolerance: tolerating  % Intake of Estimated Energy Needs: 75 - 100 %  % Meal Intake: 75 - 100 %    Nutrition Risk    Level of Risk/Frequency of Follow-up:  (F/u 1 x weekly)     Assessment and Plan    Nutrition Problem  No nutr dx @ this time    Related to (etiology):   No s/s of malnutrition; 75% meal intake    Signs and Symptoms (as evidenced by):   Meeting >/=75% of EEN/EPN    Interventions/Recommendations (treatment strategy):  None needed    Nutrition Diagnosis Status:   New         Monitor and Evaluation    Food and Nutrient Intake: food and beverage intake, energy intake  Food and Nutrient Adminstration: diet order  Physical Activity and Function: nutrition-related ADLs and IADLs  Anthropometric Measurements: weight, weight change  Biochemical Data, Medical Tests and Procedures: electrolyte and renal panel, glucose/endocrine profile  Nutrition-Focused Physical Findings: overall appearance     Nutrition Follow-Up    RD Follow-up?: Yes

## 2018-03-20 NOTE — PLAN OF CARE
Problem: Patient Care Overview (Adult)  Goal: Interdisciplinary Rounds/Family Conf  Patient refused to attend Treatment Team. Will meet with patient tomorrow.

## 2018-03-21 PROBLEM — F33.2 SEVERE EPISODE OF RECURRENT MAJOR DEPRESSIVE DISORDER, WITHOUT PSYCHOTIC FEATURES: Status: ACTIVE | Noted: 2018-03-21

## 2018-03-21 PROCEDURE — 11400000 HC PSYCH PRIVATE ROOM

## 2018-03-21 PROCEDURE — 25000003 PHARM REV CODE 250: Performed by: PSYCHIATRY & NEUROLOGY

## 2018-03-21 PROCEDURE — 99233 SBSQ HOSP IP/OBS HIGH 50: CPT | Mod: AF,HB,, | Performed by: PSYCHIATRY & NEUROLOGY

## 2018-03-21 RX ORDER — QUETIAPINE FUMARATE 200 MG/1
400 TABLET, FILM COATED ORAL NIGHTLY
Status: DISCONTINUED | OUTPATIENT
Start: 2018-03-21 | End: 2018-03-22 | Stop reason: HOSPADM

## 2018-03-21 RX ORDER — GABAPENTIN 100 MG/1
200 CAPSULE ORAL 3 TIMES DAILY
Status: DISCONTINUED | OUTPATIENT
Start: 2018-03-21 | End: 2018-03-22 | Stop reason: HOSPADM

## 2018-03-21 RX ADMIN — QUETIAPINE FUMARATE 200 MG: 200 TABLET ORAL at 08:03

## 2018-03-21 RX ADMIN — FOLIC ACID 1 MG: 1 TABLET ORAL at 08:03

## 2018-03-21 RX ADMIN — GABAPENTIN 200 MG: 100 CAPSULE ORAL at 02:03

## 2018-03-21 RX ADMIN — THERA TABS 1 TABLET: TAB at 08:03

## 2018-03-21 RX ADMIN — IBUPROFEN 600 MG: 600 TABLET, FILM COATED ORAL at 08:03

## 2018-03-21 RX ADMIN — GABAPENTIN 200 MG: 100 CAPSULE ORAL at 08:03

## 2018-03-21 RX ADMIN — QUETIAPINE FUMARATE 400 MG: 200 TABLET ORAL at 08:03

## 2018-03-21 NOTE — Clinical Note
Chief Complaint:          Pt Age/Gender/Appearance/Psych History/Symptoms and Duration:          Suicidal Ideations/Plan/Attempt History/Risk and Protective Factors:        Substance Abuse History/UTOX:        Sleep/Appetite Quality:        Compliance/Legal History/Issues:      Abuse Concerns:      Cultural/Christianity Values/Beliefs:        Supports/Marital Status/Quality of Interpersonal Relationships:          Initial Discharge Plan:  D/C to

## 2018-03-21 NOTE — PLAN OF CARE
"  Treatment Recommendation:   1:1 Intervention (as needed)    Cognitive Stimulation Skilled Activity  Sensory Stimulation Skilled Activity  Creative Expression Skilled Activity  Self Expression Skilled Activity  Mild Exercises Skilled Activity  Stress Management Skilled Activity  Coping Skilled Activity  Leisure Education and Awareness Skilled Activity    Treatment Goal(s):  Long Term Goals Refer To Master Treatment Plan    Short Term Treatment Goal(s)  Patient Will:  Exhibit Improvement in Mood  Demonstrate Constructive Expression of Feelings and Behavior  Identify (+) Leisure / Recreation Activities that Promote Wellness and Promote a Sober Lifestyle    Discharge Recommendations:  Encourage Patient to Utilize Coping Skills on a Regular Basis to Reduce the Risk of Decompensating and Re-Hospitalizations  AA/NA Meetings  Follow Up with After Care Appointments  Continue with Current Leisure Activities     Patient presents with irritable, drowsy affect and "feel bad, not good" mood. Patient states his admit is due to "the  bought me here. They lied on me." Patient states "I don't feel like talking, I don't feel good at all." According to the H&P, the patient is admitted due to suicidal ideations. Patient admits to negative leisure lifestyle of meth and cigarettes. Patient reports he is single, no children, dropped out of school in 11th grade special education classes, unemployed, lives with his grandmother. Patient states all he does is "get high." Patient unable to state a goal at this time.  "

## 2018-03-21 NOTE — PLAN OF CARE
Problem: Patient Care Overview (Adult)  Goal: Plan of Care Review  Outcome: Ongoing (interventions implemented as appropriate)  Pt has slept 9.5 hours with one interruption so far.  NAD. Resp even & unlabored.  Pathways clear and bed is low.  Q 15 minute safety checks ongoing.  All precautions maintained.

## 2018-03-21 NOTE — PLAN OF CARE
Problem: Patient Care Overview (Adult)  Goal: Plan of Care Review  Outcome: Ongoing (interventions implemented as appropriate)  Pt irritable, avoids social contact, up for meals then back to bed, denies SI, med compliant, will continue to monitor

## 2018-03-21 NOTE — PROGRESS NOTES
PSYCHIATRY DAILY INPATIENT PROGRESS NOTE  SUBSEQUENT HOSPITAL VISIT    ENCOUNTER DATE: 3/21/2018  S[facility]    DATE OF ADMISSION: 3/19/2018 11:55 PM  LENGTH OF STAY: 2 days      HISTORY    CHIEF COMPLAINT   Roland Rust is a 28 y.o. male, seen during daily marie rounds on the inpatient unit.  Roland Rust presents with the chief complaint of suicidal ideation    HPI   (Elements: Location, Quality, Severity, Duration, Timing, Content, Modifying Factors, Associated Signs & Symptoms)    The patient was seen and examined. The chart was reviewed.    Staff reports no behavioral or management issues.     The patient has been compliant with treatment. The patient denied any side effects.    Improved less Symptoms of Depression: less diminished mood or loss of interest/anhedonia; less irritability, + diminished energy, improved change in sleep, change in appetite, +diminished concentration or cognition or indecisiveness, less PMA/R, less excessive guilt or + hopelessness or + worthlessness,  No suicidal ideations       Improved Sleep issues: improved problems with initiation, endorses problems with maintenance (has back pain that returned after quitting heroin and it wakes him up, no early morning awakening with inability to return to sleep     Resolved / no Homicidal ideations: no active/passive ideations, no organized plans, no future intentions     Improved Symptoms of psychosis: no hallucinations (claims to see shadows), delusions, disorganized thinking, disorganized behavior or abnormal motor behavior, or negative symptoms (diminshed emotional expression, avolition, anhedonia, alogia, asociality      Denies Symptoms of barry or hypomania: elevated, expansive, or irritable mood with increased energy or activity; with inflated self-esteem or grandiosity, decreased need for sleep, increased rate of speech, FOI or racing thoughts, distractibility, increased goal directed activity or PMA,  risky/disinhibited behavior     Improved Symptoms of CATHY: less excessive anxiety/worry/fear, more days than not, about numerous issues, difficult to control, with restlessness, fatigue, poor concentration, irritability, muscle tension, sleep disturbance; causes functionally impairing distress      Substance Use: + intoxication, + withdrawal, + tolerance, used in larger amounts or duration than intended, unsuccessful attempts to limit or quit, increased time engaging in or seeking out, cravings or strong desire to use, failure to fulfill obligations, negative consequences in social/interpersonal/occupational,/recreational areas, use in dangerous situations, medical or psychological consequences (been doing drugs since he was 13 years old    No current cravings    ROS  General ROS: negative  Ophthalmic ROS: negative  ENT ROS: negative  Allergy and Immunology ROS: negative  Hematological and Lymphatic ROS: negative  Endocrine ROS: negative  Respiratory ROS: no cough, shortness of breath, or wheezing  Cardiovascular ROS: no chest pain or dyspnea on exertion  Gastrointestinal ROS: no abdominal pain, change in bowel habits, or black or bloody stools  Genito-Urinary ROS: no dysuria, trouble voiding, or hematuria  Musculoskeletal ROS: positive for - back pain  Neurological ROS: negative  Dermatological ROS: negative    PAST MEDICAL HISTORY   Past Medical History:   Diagnosis Date    Acute psychosis 3/5/2018    Addiction to drug     ADHD (attention deficit hyperactivity disorder)     Anxiety     Depression     History of psychiatric hospitalization     [facility]    Substance abuse     Withdrawal symptoms, drug or narcotic            PSYCHOTROPIC MEDICATIONS   Scheduled Meds:   folic acid  1 mg Oral Daily    gabapentin  200 mg Oral TID    multivitamin  1 tablet Oral Daily    QUEtiapine  200 mg Oral Daily    QUEtiapine  400 mg Oral QHS     Continuous Infusions:  PRN Meds:.acetaminophen,  "hydrOXYzine pamoate, ibuprofen, nicotine, OLANZapine, OLANZapine        EXAMINATION    VITALS   Vitals:    03/21/18 0800   BP: 113/64   Pulse: 94   Resp: 20   Temp: 97 °F (36.1 °C)       CONSTITUTIONAL  General Appearance: Heavily Tattoo; NAD     MUSCULOSKELETAL  Muscle Strength and Tone:  normal  Abnormal Involuntary Movements:  none  Gait and Station:  normal; non-ataxic     PSYCHIATRIC   Level of Consciousness: awake, alert  Orientation: p/p/t/s  Grooming: in hospital garb adequate to circumstances  Psychomotor Behavior: less PMA/R  Speech: nl r/t/v/s  Language: able to repeat words English fluent  Mood: "better"  Affect: less anxious less PMA  Thought Process:  linear and organized  Associations:  intact; no ELISA  Thought Content:  no SI / HI AVH/delusions;   Memory:  intact to recent and remote events  Attention:  intact to conversation; not distractible   Fund of Knowledge:  age and education appropriate  Estimate if Intelligence:  average based on work/education history, vocabulary and mental status exam  Insight:  good- seeks help  Judgment:   good- no bx issues, compliant and cooperative        DIAGNOSTIC TESTING   Laboratory Results  No results found for this or any previous visit (from the past 24 hour(s)).      MEDICAL DECISION MAKING    DIAGNOSES  Major Depressive Disorder Recurrent Severe without psychosis  Amphetamine Use Disorder  Opiate Use Disorder  Nicotine Use Disorder  CATHY    PROBLEM LIST AND MANAGEMENT PLANS    Depression  - counseling, will restart seroquel as patient found this helpful for mood in the past, informed consent for off label use  Anxiety - counseling and seroquel  Amphetamine / Opiate use - counseling and rehab placement  Nicotine use - replacement and counseling  Psychosocial - consider family meeting      PRESCRIPTION DRUG MANAGEMENT  Compliance: yes  Side Effects: no  Regimen Adjustments:      3/20  Seroquel 200mg QDay and 300mg QHS as previously prescribed  Will consider " serotonergic agent in future     3/21  Seroquel 400mg QHS  Gabapentin 200mg TID      DISCHARGE PLANNING  -SW to assist with aftercare planning and collateral  -Once stable discharge home with outpatient follow up care and/or rehab  -Continue inpatient treatment under a PEC and/or CEC for danger to self, and danger to others, and grave disability as evident by voiced suicidal / homicidal ideation in the setting of severe substance use and withdrawal    NEED FOR CONTINUED HOSPITALIZATION  Psychiatric illness continues to pose a potential threat to life or bodily function, of self or others, thereby requiring the need for continued inpatient psychiatric hospitalization: Yes    Protective inpatient pyschiatric hospitalization required while a safe disposition plan is enacted: Yes    Patient stabilized and ready for discharge from inpatient psychiatric unit: No      STAFF:   Eugenio Xie MD  Psychiatry

## 2018-03-21 NOTE — PLAN OF CARE
Problem: Patient Care Overview (Adult)  Goal: Plan of Care Review  Outcome: Ongoing (interventions implemented as appropriate)  Pt isolating in bed.Up for meals and snacks and eating well.Reports poor sleep last night.Mood depressed.No suicidal thoughts.Taking medications.

## 2018-03-21 NOTE — PLAN OF CARE
Problem: Overarching Goals (Adult)  Goal: Optimized Coping Skills in Response to Life Stressors  Patient did not participate in group despite staff encouragement. Will continue to encourage patient participate in group.

## 2018-03-22 VITALS
HEART RATE: 87 BPM | TEMPERATURE: 97 F | WEIGHT: 132 LBS | DIASTOLIC BLOOD PRESSURE: 70 MMHG | SYSTOLIC BLOOD PRESSURE: 119 MMHG | HEIGHT: 68 IN | BODY MASS INDEX: 20 KG/M2 | RESPIRATION RATE: 18 BRPM

## 2018-03-22 PROCEDURE — 99239 HOSP IP/OBS DSCHRG MGMT >30: CPT | Mod: AF,HB,, | Performed by: PSYCHIATRY & NEUROLOGY

## 2018-03-22 PROCEDURE — 25000003 PHARM REV CODE 250: Performed by: PSYCHIATRY & NEUROLOGY

## 2018-03-22 RX ORDER — QUETIAPINE FUMARATE 200 MG/1
200 TABLET, FILM COATED ORAL DAILY
Qty: 30 TABLET | Refills: 0 | Status: ON HOLD | OUTPATIENT
Start: 2018-03-23 | End: 2018-04-03 | Stop reason: HOSPADM

## 2018-03-22 RX ORDER — QUETIAPINE FUMARATE 400 MG/1
400 TABLET, FILM COATED ORAL NIGHTLY
Qty: 30 TABLET | Refills: 0 | Status: ON HOLD | OUTPATIENT
Start: 2018-03-22 | End: 2018-04-03 | Stop reason: HOSPADM

## 2018-03-22 RX ORDER — GABAPENTIN 300 MG/1
300 CAPSULE ORAL 3 TIMES DAILY
Qty: 90 CAPSULE | Refills: 0 | Status: ON HOLD | OUTPATIENT
Start: 2018-03-22 | End: 2018-04-03 | Stop reason: HOSPADM

## 2018-03-22 RX ORDER — IBUPROFEN 200 MG
1 TABLET ORAL DAILY
Status: ON HOLD | COMMUNITY
Start: 2018-03-22 | End: 2018-04-03 | Stop reason: HOSPADM

## 2018-03-22 RX ADMIN — FOLIC ACID 1 MG: 1 TABLET ORAL at 08:03

## 2018-03-22 RX ADMIN — THERA TABS 1 TABLET: TAB at 08:03

## 2018-03-22 RX ADMIN — QUETIAPINE FUMARATE 200 MG: 200 TABLET ORAL at 08:03

## 2018-03-22 RX ADMIN — GABAPENTIN 200 MG: 100 CAPSULE ORAL at 08:03

## 2018-03-22 NOTE — PLAN OF CARE
Problem: Patient Care Overview (Adult)  Goal: Discharge Needs Assessment  Patient to discharge to grandmother's home. Patient will Follow up with [facility]

## 2018-03-22 NOTE — PLAN OF CARE
Problem: Overarching Goals (Adult)  Goal: Develops/Participates in [facility] [place] to Support Successful Transition    Intervention: Foster [facility] [place]    Patient uncooperative, isolating in room. Patient information from patient, health record.     Chief Complaint  Patient admitted with psychosis, SI , substance use.   Patient recently discharged from [facility].    Per Psych Nurse   Patient arrived to [facility] via stretcher, transported by [facility] and escorted by .  Patient calm and cooperative, but constantly moving and twisting neck and torso. Interactions appropriate and extremely talkative.  Assessed for contraband, none found.  Brought onto unit and skin assessment done, noted multiple tattoos over face, neck, arms and chest.  Toenails of right and left great toe are grayish in color.  Fingernails are elongated.  ID band placed on wrist. Contracted for safety.   PEC per Dr. Kim at [facility].  UDS + for amphetamines.  History of heroin use, reports used heroin four days ago.  Reports feeling depressed and anxious after threatening his grandmother after refusing to give him money for drugs.  Reports feeling angry and homicidal toward others at times.  Denies suicidal ideations, audio hallucinations and visual hallucinations.  States was in [facility] for theft for drug money and was released in 2014.  Did not complete high school and is unemployed.  Lives with grandmother.      Pt Age/Gender/Appearance/Psych History/Symptoms and Duration:  Patient is a 27yo male with hx of adjustment d/o, depression, substance use.     Suicidal Ideations/Plan/Attempt History/Risk and Protective Factors:  Patient denies at this time.       Substance Abuse History/UTOX:  Patient had a positive UTOX for amphetamines.     Sleep/Appetite Quality:  unknown    Compliance/Legal History/Issues:  None known    Abuse Concerns:  None     Cultural/Religion Values/Beliefs:  None     Supports/Marital  Status/Quality of Interpersonal Relationships:  Grandmother. Patient refused to give consent.      Initial Discharge Plan:  D/C to possibly grandmother's home.   [facility]

## 2018-03-22 NOTE — PSYCH
Patient will be following up with [facility] at [address] in [place], [phone]  Appointment is on 3/26/2018 at 7:30 am.  Patient will receive a tobacco cessation therapy appointment at mentioned appointment along with a substance abuse therapy appointment due to a positive UDS at admit.  AVS faxed on 3/22/2018 at 12:33 pm.

## 2018-03-22 NOTE — PLAN OF CARE
Problem: Patient Care Overview (Adult)  Goal: Plan of Care Review  Outcome: Ongoing (interventions implemented as appropriate)  Received pt. Lying in bed at start of the shift with eyes closed. Resp. Even and unlabored without distress noted. Out of bed for snacks and meds only.    Pt. Slept  7.5 hours so far this shift. Cont. Plan.

## 2018-03-22 NOTE — DISCHARGE SUMMARY
"Discharge Summary  Psychiatry    Admit Date: 3/19/2018    Discharge Date and Time:  03/22/2018 12:07 PM    Attending Physician: Eugenio Xie MD    Discharge Provider: Eugenio Xie    Reason for Admission:  Suicidal ideation    History of Present Illness:   Patient was recently discharged from [facility] after a five day stay.  At that time he was using methamphetamine and experiencing psychosis after having not slept for three days.  He was discharged with a diagnosis of "acute psychosis" on Seroquel 200mg QDay and 300mg QHS.  He was also in [facility] late February for five days and here at [facility] in January.       Patient brought in by police saying that he was tired and doesn't want to live anymore.  He had an argument with his grandmother.  He wants to get placed in a rehab.  He spoke with our two medical students at length but is agitated with me and refused treatment team when I said that I wouldn't be giving him tramadol.  Patient said he did not relapse on opiates but rather amphetamines.       Endorses Symptoms of Depression: + diminished mood or loss of interest/anhedonia; + irritability, + diminished energy, + change in sleep, change in appetite, +diminished concentration or cognition or indecisiveness, + PMA/R, + excessive guilt or + hopelessness or + worthlessness, no suicidal ideations (claims that he's been crying pretty much every night, his brother laughs at him and says his mother hates him, says that no one loves him)     Endorses Sleep issues: endorses problems with initiation, endorses problems with maintenance (has back pain that returned after quitting heroin and it wakes him up, no early morning awakening with inability to return to sleep     Endorses Homicidal ideations: +active/passive ideations (says he doesn't want to kill himself but has thought about "pulling a gun on a  so they'd kill him", doesn't have plans to do this though), no organized plans, no future intentions " "(when his father laughed at him, it made him want to "lash out")     Endorses Symptoms of psychosis: + hallucinations (claims to see shadows), delusions, disorganized thinking, disorganized behavior or abnormal motor behavior, or negative symptoms (diminshed emotional expression, avolition, anhedonia, alogia, asociality      Denies Symptoms of barry or hypomania: elevated, expansive, or irritable mood with increased energy or activity; with inflated self-esteem or grandiosity, decreased need for sleep, increased rate of speech, FOI or racing thoughts, distractibility, increased goal directed activity or PMA, risky/disinhibited behavior     Endorses Symptoms of CATHY: +  excessive anxiety/worry/fear, more days than not, about numerous issues, difficult to control, with restlessness, fatigue, poor concentration, irritability, muscle tension, sleep disturbance; causes functionally impairing distress      Denies Symptoms of Panic Disorder: recurrent panic attacks, precipitated or un-precipitated, source of worry and/or behavioral changes secondary; with or without agoraphobia     Denies Symptoms of PTSD: h/o trauma; re-experiencing/intrusive symptoms, avoidant behavior, negative alterations in cognition or mood, or hyperarousal symptoms; with or without dissociative symptoms      DeniesSymptoms of OCD: obsessions or compulsions      Denies Symptoms of Eating Disorders: anorexia, bulimia or binging     Endorses Substance Use: + intoxication, + withdrawal, + tolerance, used in larger amounts or duration than intended, unsuccessful attempts to limit or quit, increased time engaging in or seeking out, cravings or strong desire to use, failure to fulfill obligations, negative consequences in social/interpersonal/occupational,/recreational areas, use in dangerous situations, medical or psychological consequences (been doing drugs since he was 13 years old    Procedures Performed: * No surgery found *    Hospital Course " (synopsis of major diagnoses, care, treatment, and services provided during the course of the hospital stay):   The patient was stabilized and discharged on the following medications:  Quetiapine 200mg QDay offlabel for depression (responded well in the past)  Quetiapine 400mg QHS offlabel for depression (responded well in the past)  Gabapentin 300mg TID Chronic pain    The patient was compliant with treatment. The patient denied any side effects.     Improved less Symptoms of Depression: less diminished mood or loss of interest/anhedonia; less irritability, + diminished energy, improved change in sleep, change in appetite, +diminished concentration or cognition or indecisiveness, less PMA/R, less excessive guilt or + hopelessness or + worthlessness,  No suicidal ideations     Improved Sleep issues: improved problems with initiation, endorses problems with maintenance (has back pain that returned after quitting heroin and it wakes him up, no early morning awakening with inability to return to sleep     Resolved / no Homicidal ideations: no active/passive ideations, no organized plans, no future intentions     Improved Symptoms of psychosis: no hallucinations (claims to see shadows), delusions, disorganized thinking, disorganized behavior or abnormal motor behavior, or negative symptoms (diminshed emotional expression, avolition, anhedonia, alogia, asociality      Denies Symptoms of barry or hypomania: elevated, expansive, or irritable mood with increased energy or activity; with inflated self-esteem or grandiosity, decreased need for sleep, increased rate of speech, FOI or racing thoughts, distractibility, increased goal directed activity or PMA, risky/disinhibited behavior     Improved Symptoms of CATHY: less excessive anxiety/worry/fear, more days than not, about numerous issues, difficult to control, with restlessness, fatigue, poor concentration, irritability, muscle tension, sleep disturbance; causes functionally  impairing distress     Discussed diagnosis, risks and benefits of proposed treatment vs alternative treatments vs no treatment, and potential side effects of these treatments.  The patient expresses understanding of the above and displays the capacity to agree with this treatment given said understanding.  Patient also agrees that, currently, the benefits outweigh the risks and would like to pursue treatment at this time.    MSE: stated age, casually dressed, well groomed.  No psychomotor agitation or retardation.  No abnormal involuntary movements.  Gait normal.  Speech normal, conversational.  Language fluent English. Mood fine.  Affect normal range, pleasant, euthymic.  Thought process linear.  Associations intact.  Denies suicidal or homicidal ideation.  Denies auditory hallucinations, paranoid ideation, ideas of reference.  Memory intact.  Attention intact.  Fund of knowledge intact.  Insight intact.  Judgment intact.  Alert and oriented to person, place, time.      Tobacco Usage:  Is patient a smoker? Yes  Does patient want prescription for Tobacco Cessation? No  Does patient want counseling for Tobacco Cessation? No    If patient would like to quit, then over the counter nicotine patch could be used. The patient could also follow up with his PCP or psychiatric provider for other alternatives.     Final Diagnoses:    Principal Problem: Major Depressive Disorder Recurrent Severe without psychosis   Secondary Diagnoses:   Amphetamine Use Disorder  Opiate Use Disorder  Nicotine Use Disorder  CATHY  Chronic Pain    Labs:  Admission on 03/19/2018   Component Date Value Ref Range Status    Glucose, Fasting 03/20/2018 99  70 - 110 mg/dL Final   Admission on 03/19/2018, Discharged on 03/19/2018   Component Date Value Ref Range Status    Benzodiazepines 03/19/2018 Negative   Final    Cocaine (Metab.) 03/19/2018 Negative   Final    Opiate Scrn, Ur 03/19/2018 Negative   Final    Barbiturate Screen, Ur 03/19/2018  Negative   Final    Amphetamine Screen, Ur 03/19/2018 Presumptive Positive   Final    THC 03/19/2018 Negative   Final    Phencyclidine 03/19/2018 Negative   Final    Creatinine, Random Ur 03/19/2018 162.1  23.0 - 375.0 mg/dL Final    Toxicology Information 03/19/2018 SEE COMMENT   Final    WBC 03/19/2018 10.51  3.90 - 12.70 K/uL Final    RBC 03/19/2018 4.51* 4.60 - 6.20 M/uL Final    Hemoglobin 03/19/2018 13.9* 14.0 - 18.0 g/dL Final    Hematocrit 03/19/2018 40.8  40.0 - 54.0 % Final    MCV 03/19/2018 91  82 - 98 fL Final    MCH 03/19/2018 30.8  27.0 - 31.0 pg Final    MCHC 03/19/2018 34.1  32.0 - 36.0 g/dL Final    RDW 03/19/2018 12.4  11.5 - 14.5 % Final    Platelets 03/19/2018 364* 150 - 350 K/uL Final    MPV 03/19/2018 11.6  9.2 - 12.9 fL Final    Gran # (ANC) 03/19/2018 6.4  1.8 - 7.7 K/uL Final    Lymph # 03/19/2018 3.1  1.0 - 4.8 K/uL Final    Mono # 03/19/2018 0.8  0.3 - 1.0 K/uL Final    Eos # 03/19/2018 0.1  0.0 - 0.5 K/uL Final    Baso # 03/19/2018 0.11  0.00 - 0.20 K/uL Final    nRBC 03/19/2018 0  0 /100 WBC Final    Gran% 03/19/2018 61.0  38.0 - 73.0 % Final    Lymph% 03/19/2018 29.8  18.0 - 48.0 % Final    Mono% 03/19/2018 7.3  4.0 - 15.0 % Final    Eosinophil% 03/19/2018 0.9  0.0 - 8.0 % Final    Basophil% 03/19/2018 1.0  0.0 - 1.9 % Final    Differential Method 03/19/2018 Automated   Final    Sodium 03/19/2018 141  136 - 145 mmol/L Final    Potassium 03/19/2018 3.4* 3.5 - 5.1 mmol/L Final    Chloride 03/19/2018 100  95 - 110 mmol/L Final    CO2 03/19/2018 27  23 - 29 mmol/L Final    Glucose 03/19/2018 72  70 - 110 mg/dL Final    BUN, Bld 03/19/2018 16  6 - 20 mg/dL Final    Creatinine 03/19/2018 0.9  0.5 - 1.4 mg/dL Final    Calcium 03/19/2018 9.9  8.7 - 10.5 mg/dL Final    Total Protein 03/19/2018 8.7* 6.0 - 8.4 g/dL Final    Albumin 03/19/2018 4.8  3.5 - 5.2 g/dL Final    Total Bilirubin 03/19/2018 0.5  0.1 - 1.0 mg/dL Final    Alkaline Phosphatase 03/19/2018  76  55 - 135 U/L Final    AST 03/19/2018 57* 10 - 40 U/L Final    ALT 03/19/2018 123* 10 - 44 U/L Final    Anion Gap 03/19/2018 14  8 - 16 mmol/L Final    eGFR if African American 03/19/2018 >60.0  >60 mL/min/1.73 m^2 Final    eGFR if non African American 03/19/2018 >60.0  >60 mL/min/1.73 m^2 Final    Specimen UA 03/19/2018 Urine, Clean Catch   Final    Color, UA 03/19/2018 Yellow  Yellow, Straw, Gretchen Final    Appearance, UA 03/19/2018 Cloudy* Clear Final    pH, UA 03/19/2018 6.0  5.0 - 8.0 Final    Specific Gravity, UA 03/19/2018 1.020  1.005 - 1.030 Final    Protein, UA 03/19/2018 Negative  Negative Final    Glucose, UA 03/19/2018 Negative  Negative Final    Ketones, UA 03/19/2018 Negative  Negative Final    Bilirubin (UA) 03/19/2018 Negative  Negative Final    Occult Blood UA 03/19/2018 Negative  Negative Final    Nitrite, UA 03/19/2018 Negative  Negative Final    Urobilinogen, UA 03/19/2018 Negative  <2.0 EU/dL Final    Leukocytes, UA 03/19/2018 Negative  Negative Final    RBC, UA 03/19/2018 5* 0 - 4 /hpf Final    WBC, UA 03/19/2018 7* 0 - 5 /hpf Final    WBC Clumps, UA 03/19/2018 Few* None-Rare Final    Bacteria, UA 03/19/2018 Rare  None-Occ /hpf Final    Squam Epithel, UA 03/19/2018 2  /hpf Final    Amorphous, UA 03/19/2018 Few  None-Moderate Final    Microscopic Comment 03/19/2018 SEE COMMENT   Final   Admission on 03/05/2018, Discharged on 03/05/2018   Component Date Value Ref Range Status    WBC 03/05/2018 7.04  3.90 - 12.70 K/uL Final    RBC 03/05/2018 4.17* 4.60 - 6.20 M/uL Final    Hemoglobin 03/05/2018 12.9* 14.0 - 18.0 g/dL Final    Hematocrit 03/05/2018 38.1* 40.0 - 54.0 % Final    MCV 03/05/2018 91  82 - 98 fL Final    MCH 03/05/2018 30.9  27.0 - 31.0 pg Final    MCHC 03/05/2018 33.9  32.0 - 36.0 g/dL Final    RDW 03/05/2018 12.8  11.5 - 14.5 % Final    Platelets 03/05/2018 237  150 - 350 K/uL Final    MPV 03/05/2018 10.8  9.2 - 12.9 fL Final    Gran # (ANC)  03/05/2018 3.7  1.8 - 7.7 K/uL Final    Lymph # 03/05/2018 2.4  1.0 - 4.8 K/uL Final    Mono # 03/05/2018 0.8  0.3 - 1.0 K/uL Final    Eos # 03/05/2018 0.1  0.0 - 0.5 K/uL Final    Baso # 03/05/2018 0.07  0.00 - 0.20 K/uL Final    nRBC 03/05/2018 0  0 /100 WBC Final    Gran% 03/05/2018 52.1  38.0 - 73.0 % Final    Lymph% 03/05/2018 33.5  18.0 - 48.0 % Final    Mono% 03/05/2018 11.6  4.0 - 15.0 % Final    Eosinophil% 03/05/2018 1.8  0.0 - 8.0 % Final    Basophil% 03/05/2018 1.0  0.0 - 1.9 % Final    Differential Method 03/05/2018 Automated   Final    Sodium 03/05/2018 141  136 - 145 mmol/L Final    Potassium 03/05/2018 3.4* 3.5 - 5.1 mmol/L Final    Chloride 03/05/2018 107  95 - 110 mmol/L Final    CO2 03/05/2018 26  23 - 29 mmol/L Final    Glucose 03/05/2018 136* 70 - 110 mg/dL Final    BUN, Bld 03/05/2018 17  6 - 20 mg/dL Final    Creatinine 03/05/2018 0.9  0.5 - 1.4 mg/dL Final    Calcium 03/05/2018 9.5  8.7 - 10.5 mg/dL Final    Total Protein 03/05/2018 7.8  6.0 - 8.4 g/dL Final    Albumin 03/05/2018 4.5  3.5 - 5.2 g/dL Final    Total Bilirubin 03/05/2018 0.6  0.1 - 1.0 mg/dL Final    Alkaline Phosphatase 03/05/2018 79  55 - 135 U/L Final    AST 03/05/2018 277* 10 - 40 U/L Final    ALT 03/05/2018 693* 10 - 44 U/L Final    Anion Gap 03/05/2018 8  8 - 16 mmol/L Final    eGFR if African American 03/05/2018 >60.0  >60 mL/min/1.73 m^2 Final    eGFR if non African American 03/05/2018 >60.0  >60 mL/min/1.73 m^2 Final    TSH 03/05/2018 0.690  0.400 - 4.000 uIU/mL Final    Specimen UA 03/05/2018 Urine, Clean Catch   Final    Color, UA 03/05/2018 Yellow  Yellow, Straw, Gretchen Final    Appearance, UA 03/05/2018 Hazy* Clear Final    pH, UA 03/05/2018 5.0  5.0 - 8.0 Final    Specific Gravity, UA 03/05/2018 1.025  1.005 - 1.030 Final    Protein, UA 03/05/2018 Negative  Negative Final    Glucose, UA 03/05/2018 Negative  Negative Final    Ketones, UA 03/05/2018 Negative  Negative Final     Bilirubin (UA) 03/05/2018 Negative  Negative Final    Occult Blood UA 03/05/2018 Negative  Negative Final    Nitrite, UA 03/05/2018 Negative  Negative Final    Urobilinogen, UA 03/05/2018 Negative  <2.0 EU/dL Final    Leukocytes, UA 03/05/2018 Negative  Negative Final    Benzodiazepines 03/05/2018 Presumptive Positive   Final    Cocaine (Metab.) 03/05/2018 Negative   Final    Opiate Scrn, Ur 03/05/2018 Negative   Final    Barbiturate Screen, Ur 03/05/2018 Negative   Final    Amphetamine Screen, Ur 03/05/2018 Presumptive Positive   Final    THC 03/05/2018 Negative   Final    Phencyclidine 03/05/2018 Negative   Final    Creatinine, Random Ur 03/05/2018 203.3  23.0 - 375.0 mg/dL Final    Toxicology Information 03/05/2018 SEE COMMENT   Final    Alcohol, Medical, Serum 03/05/2018 <5  <10 mg/dL Final    Acetaminophen (Tylenol), Serum 03/05/2018 <10.0* 10.0 - 20.0 ug/mL Final   Admission on 02/21/2018, Discharged on 02/23/2018   Component Date Value Ref Range Status    WBC 02/21/2018 13.72* 3.90 - 12.70 K/uL Final    RBC 02/21/2018 4.19* 4.60 - 6.20 M/uL Final    Hemoglobin 02/21/2018 12.8* 14.0 - 18.0 g/dL Final    Hematocrit 02/21/2018 37.8* 40.0 - 54.0 % Final    MCV 02/21/2018 90  82 - 98 fL Final    MCH 02/21/2018 30.5  27.0 - 31.0 pg Final    MCHC 02/21/2018 33.9  32.0 - 36.0 g/dL Final    RDW 02/21/2018 12.9  11.5 - 14.5 % Final    Platelets 02/21/2018 269  150 - 350 K/uL Final    MPV 02/21/2018 11.1  9.2 - 12.9 fL Final    Gran # (ANC) 02/21/2018 9.9* 1.8 - 7.7 K/uL Final    Lymph # 02/21/2018 2.6  1.0 - 4.8 K/uL Final    Mono # 02/21/2018 1.0  0.3 - 1.0 K/uL Final    Eos # 02/21/2018 0.1  0.0 - 0.5 K/uL Final    Baso # 02/21/2018 0.08  0.00 - 0.20 K/uL Final    nRBC 02/21/2018 0  0 /100 WBC Final    Gran% 02/21/2018 72.4  38.0 - 73.0 % Final    Lymph% 02/21/2018 19.0  18.0 - 48.0 % Final    Mono% 02/21/2018 7.5  4.0 - 15.0 % Final    Eosinophil% 02/21/2018 0.5  0.0 - 8.0 % Final     Basophil% 02/21/2018 0.6  0.0 - 1.9 % Final    Differential Method 02/21/2018 Automated   Final    Sodium 02/21/2018 138  136 - 145 mmol/L Final    Potassium 02/21/2018 3.4* 3.5 - 5.1 mmol/L Final    Chloride 02/21/2018 105  95 - 110 mmol/L Final    CO2 02/21/2018 23  23 - 29 mmol/L Final    Glucose 02/21/2018 91  70 - 110 mg/dL Final    BUN, Bld 02/21/2018 17  6 - 20 mg/dL Final    Creatinine 02/21/2018 0.9  0.5 - 1.4 mg/dL Final    Calcium 02/21/2018 9.3  8.7 - 10.5 mg/dL Final    Total Protein 02/21/2018 7.7  6.0 - 8.4 g/dL Final    Albumin 02/21/2018 4.6  3.5 - 5.2 g/dL Final    Total Bilirubin 02/21/2018 0.9  0.1 - 1.0 mg/dL Final    Alkaline Phosphatase 02/21/2018 61  55 - 135 U/L Final    AST 02/21/2018 57* 10 - 40 U/L Final    ALT 02/21/2018 60* 10 - 44 U/L Final    Anion Gap 02/21/2018 10  8 - 16 mmol/L Final    eGFR if African American 02/21/2018 >60.0  >60 mL/min/1.73 m^2 Final    eGFR if non African American 02/21/2018 >60.0  >60 mL/min/1.73 m^2 Final    TSH 02/21/2018 0.540  0.400 - 4.000 uIU/mL Final    Specimen UA 02/21/2018 Urine, Clean Catch   Final    Color, UA 02/21/2018 Yellow  Yellow, Straw, Gretchen Final    Appearance, UA 02/21/2018 Clear  Clear Final    pH, UA 02/21/2018 5.0  5.0 - 8.0 Final    Specific Gravity, UA 02/21/2018 1.020  1.005 - 1.030 Final    Protein, UA 02/21/2018 1+* Negative Final    Glucose, UA 02/21/2018 Negative  Negative Final    Ketones, UA 02/21/2018 Trace* Negative Final    Bilirubin (UA) 02/21/2018 Negative  Negative Final    Occult Blood UA 02/21/2018 Negative  Negative Final    Nitrite, UA 02/21/2018 Negative  Negative Final    Urobilinogen, UA 02/21/2018 Negative  <2.0 EU/dL Final    Leukocytes, UA 02/21/2018 Negative  Negative Final    Benzodiazepines 02/21/2018 Negative   Final    Cocaine (Metab.) 02/21/2018 Negative   Final    Opiate Scrn, Ur 02/21/2018 Presumptive Positive   Final    Barbiturate Screen, Ur 02/21/2018 Negative    Final    Amphetamine Screen, Ur 02/21/2018 Presumptive Positive   Final    THC 02/21/2018 Negative   Final    Phencyclidine 02/21/2018 Negative   Final    Creatinine, Random Ur 02/21/2018 233.2  23.0 - 375.0 mg/dL Final    Toxicology Information 02/21/2018 SEE COMMENT   Final    Alcohol, Medical, Serum 02/21/2018 <5  <10 mg/dL Final    Acetaminophen (Tylenol), Serum 02/21/2018 <10.0* 10.0 - 20.0 ug/mL Final    RBC, UA 02/21/2018 1  0 - 4 /hpf Final    WBC, UA 02/21/2018 2  0 - 5 /hpf Final    Bacteria, UA 02/21/2018 Rare  None-Occ /hpf Final    Squam Epithel, UA 02/21/2018 2  /hpf Final    Hyaline Casts, UA 02/21/2018 26* 0-1/lpf /lpf Final    Microscopic Comment 02/21/2018 SEE COMMENT   Final         Discharged Condition: stable and improved; not currently a danger to self/others or gravely disabled    Disposition: Home or Self Care    Is patient being discharged on multiple neuroleptics? No    Follow Up/Patient Instructions:     Medications:  Reconciled Home Medications:   Current Discharge Medication List      START taking these medications    Details   gabapentin (NEURONTIN) 300 MG capsule Take 1 capsule (300 mg total) by mouth 3 (three) times daily.  Qty: 90 capsule, Refills: 0      nicotine (NICODERM CQ) 14 mg/24 hr Place 1 patch onto the skin once daily.         CONTINUE these medications which have CHANGED    Details   !! QUEtiapine (SEROQUEL) 200 MG Tab Take 1 tablet (200 mg total) by mouth once daily.  Qty: 30 tablet, Refills: 0      !! QUEtiapine (SEROQUEL) 400 MG tablet Take 1 tablet (400 mg total) by mouth every evening.  Qty: 30 tablet, Refills: 0       !! - Potential duplicate medications found. Please discuss with provider.      STOP taking these medications       nicotine (NICODERM CQ) 21 mg/24 hr Comments:   Reason for Stopping:               Discharge Procedure Orders  Diet Adult Regular     Activity as tolerated     Notify your health care provider if you experience any of the  following:   Order Comments: Suicidal ideation       Follow-up Information     [facility] On 3/26/2018.    Specialties:  Psychology, Behavioral Health  Why:  Outpatient Psych Services, as scheduled for 7:30a.m.   Contact information:  [address] [id number]  [place] [address] [phone]                     Diet: regular     Activity as tolerated    Total time spent discharging patient: 32 minutes    Eugenio Xie MD  Psychiatry

## 2018-03-22 NOTE — PLAN OF CARE
Problem: Overarching Goals (Adult)  Goal: Develops/Participates in Therapeutic [place] to Support Successful Transition    Intervention: Mutually Develop Transition Plan  Attempted to meet with patient. Patient isolating in room in bed. Patient irritable and uncooperative.  Patient refused to sign release of information for anyone. Patient unsure of his discharge plan, but did not wish to discuss. Will attempt to meet with patient again at a later time.

## 2018-03-22 NOTE — NURSING
Pt arranged for discharge today.  All belongings accounted for and will be returned upon discharge.  Pt denies any S/I or H/I or psychosis.  Pt appears to be at his baseline.  Affect is flat at times.  Pt still has a short temper and is irritable at times but redirectable.  Pt VS WDL, ambulatory, AAO times 3, speech clear and denies any pain or discomfort at this time.  No acute distress noted.  Transportation will be arranged for pt.

## 2018-04-13 ENCOUNTER — HOSPITAL ENCOUNTER (EMERGENCY)
Facility: HOSPITAL | Age: 28
Discharge: PSYCHIATRIC OR SUBSTANCE USE FACILITY | End: 2018-04-14
Attending: EMERGENCY MEDICINE
Payer: MEDICAID

## 2018-04-13 DIAGNOSIS — F29 PSYCHOSIS, UNSPECIFIED PSYCHOSIS TYPE: Primary | ICD-10-CM

## 2018-04-13 LAB
BASOPHILS # BLD AUTO: 0.08 K/UL
BASOPHILS NFR BLD: 0.8 %
DIFFERENTIAL METHOD: ABNORMAL
EOSINOPHIL # BLD AUTO: 0.3 K/UL
EOSINOPHIL NFR BLD: 2.8 %
ERYTHROCYTE [DISTWIDTH] IN BLOOD BY AUTOMATED COUNT: 12.9 %
HCT VFR BLD AUTO: 39.9 %
HGB BLD-MCNC: 13.8 G/DL
LYMPHOCYTES # BLD AUTO: 2 K/UL
LYMPHOCYTES NFR BLD: 19.9 %
MCH RBC QN AUTO: 31.2 PG
MCHC RBC AUTO-ENTMCNC: 34.6 G/DL
MCV RBC AUTO: 90 FL
MONOCYTES # BLD AUTO: 0.8 K/UL
MONOCYTES NFR BLD: 8.1 %
NEUTROPHILS # BLD AUTO: 6.9 K/UL
NEUTROPHILS NFR BLD: 68.2 %
PLATELET # BLD AUTO: 307 K/UL
PMV BLD AUTO: 11.6 FL
RBC # BLD AUTO: 4.42 M/UL
WBC # BLD AUTO: 10.15 K/UL

## 2018-04-13 PROCEDURE — 84443 ASSAY THYROID STIM HORMONE: CPT

## 2018-04-13 PROCEDURE — 81003 URINALYSIS AUTO W/O SCOPE: CPT | Mod: 59

## 2018-04-13 PROCEDURE — 80329 ANALGESICS NON-OPIOID 1 OR 2: CPT

## 2018-04-13 PROCEDURE — 80053 COMPREHEN METABOLIC PANEL: CPT

## 2018-04-13 PROCEDURE — 96372 THER/PROPH/DIAG INJ SC/IM: CPT

## 2018-04-13 PROCEDURE — 99285 EMERGENCY DEPT VISIT HI MDM: CPT | Mod: 25

## 2018-04-13 PROCEDURE — 80320 DRUG SCREEN QUANTALCOHOLS: CPT

## 2018-04-13 PROCEDURE — 63600175 PHARM REV CODE 636 W HCPCS: Performed by: EMERGENCY MEDICINE

## 2018-04-13 PROCEDURE — 85025 COMPLETE CBC W/AUTO DIFF WBC: CPT

## 2018-04-13 PROCEDURE — 80307 DRUG TEST PRSMV CHEM ANLYZR: CPT

## 2018-04-13 RX ORDER — HALOPERIDOL 5 MG/ML
5 INJECTION INTRAMUSCULAR
Status: COMPLETED | OUTPATIENT
Start: 2018-04-13 | End: 2018-04-13

## 2018-04-13 RX ORDER — LORAZEPAM 2 MG/ML
2 INJECTION INTRAMUSCULAR
Status: COMPLETED | OUTPATIENT
Start: 2018-04-13 | End: 2018-04-13

## 2018-04-13 RX ADMIN — HALOPERIDOL LACTATE 5 MG: 5 INJECTION, SOLUTION INTRAMUSCULAR at 11:04

## 2018-04-13 RX ADMIN — LORAZEPAM 2 MG: 2 INJECTION, SOLUTION INTRAMUSCULAR; INTRAVENOUS at 11:04

## 2018-04-14 VITALS
BODY MASS INDEX: 20 KG/M2 | DIASTOLIC BLOOD PRESSURE: 58 MMHG | OXYGEN SATURATION: 97 % | HEIGHT: 68 IN | RESPIRATION RATE: 18 BRPM | WEIGHT: 132 LBS | SYSTOLIC BLOOD PRESSURE: 113 MMHG | TEMPERATURE: 98 F | HEART RATE: 87 BPM

## 2018-04-14 LAB
ALBUMIN SERPL BCP-MCNC: 4.4 G/DL
ALP SERPL-CCNC: 77 U/L
ALT SERPL W/O P-5'-P-CCNC: 20 U/L
AMPHET+METHAMPHET UR QL: NORMAL
ANION GAP SERPL CALC-SCNC: 10 MMOL/L
APAP SERPL-MCNC: <3 UG/ML
AST SERPL-CCNC: 19 U/L
BARBITURATES UR QL SCN>200 NG/ML: NEGATIVE
BENZODIAZ UR QL SCN>200 NG/ML: NEGATIVE
BILIRUB SERPL-MCNC: 0.2 MG/DL
BILIRUB UR QL STRIP: NEGATIVE
BUN SERPL-MCNC: 10 MG/DL
BZE UR QL SCN: NEGATIVE
CALCIUM SERPL-MCNC: 10.4 MG/DL
CANNABINOIDS UR QL SCN: NEGATIVE
CHLORIDE SERPL-SCNC: 105 MMOL/L
CLARITY UR: ABNORMAL
CO2 SERPL-SCNC: 28 MMOL/L
COLOR UR: YELLOW
CREAT SERPL-MCNC: 0.9 MG/DL
CREAT UR-MCNC: 86.4 MG/DL
EST. GFR  (AFRICAN AMERICAN): >60 ML/MIN/1.73 M^2
EST. GFR  (NON AFRICAN AMERICAN): >60 ML/MIN/1.73 M^2
ETHANOL SERPL-MCNC: <10 MG/DL
GLUCOSE SERPL-MCNC: 106 MG/DL
GLUCOSE UR QL STRIP: NEGATIVE
HGB UR QL STRIP: NEGATIVE
KETONES UR QL STRIP: NEGATIVE
LEUKOCYTE ESTERASE UR QL STRIP: NEGATIVE
METHADONE UR QL SCN>300 NG/ML: NEGATIVE
NITRITE UR QL STRIP: NEGATIVE
OPIATES UR QL SCN: NEGATIVE
PCP UR QL SCN>25 NG/ML: NEGATIVE
PH UR STRIP: 6 [PH] (ref 5–8)
POTASSIUM SERPL-SCNC: 3.5 MMOL/L
PROT SERPL-MCNC: 8.4 G/DL
PROT UR QL STRIP: NEGATIVE
SODIUM SERPL-SCNC: 143 MMOL/L
SP GR UR STRIP: 1.01 (ref 1–1.03)
TOXICOLOGY INFORMATION: NORMAL
TSH SERPL DL<=0.005 MIU/L-ACNC: 0.55 UIU/ML
URN SPEC COLLECT METH UR: ABNORMAL
UROBILINOGEN UR STRIP-ACNC: NEGATIVE EU/DL

## 2018-04-14 NOTE — ED PROVIDER NOTES
Encounter Date: 4/13/2018       History     Chief Complaint   Patient presents with    Paranoid     pt having auditory hallucinations, that are telling him people are out to get him. Pt staying at a [facility] house, ANDREA was called by supervisor at [facility] due to pt stating he was unsure if he wanted to harm himself      28-year-old male with history of opiate abuse and psychosis transferred from his [facility] house for evaluation of bizarre behavior and paranoia.  At time of history and physical patient is poorly cooperative but is controlling questioning feeling came here to get him.  He acknowledges having auditory hallucinations.  And states that he is unsure if he wants to hurt himself or not.          Review of patient's allergies indicates:   Allergen Reactions    Ampicillin Hives    Penicillins Hives     Past Medical History:   Diagnosis Date    Acute psychosis 3/5/2018    Addiction to drug     ADHD (attention deficit hyperactivity disorder)     Anxiety     Depression     History of psychiatric hospitalization     Shanda Carmona-says for rehab    Substance abuse     Withdrawal symptoms, drug or narcotic      Past Surgical History:   Procedure Laterality Date    INNER EAR SURGERY       Family History   Problem Relation Age of Onset    No Known Problems Mother     No Known Problems Father     No Known Problems Brother      Social History   Substance Use Topics    Smoking status: Current Every Day Smoker     Packs/day: 1.00     Years: 10.00     Types: Cigarettes     Start date: 1/28/2009    Smokeless tobacco: Never Used    Alcohol use No     Review of Systems   Unable to perform ROS: Psychiatric disorder       Physical Exam     Initial Vitals [04/13/18 2240]   BP Pulse Resp Temp SpO2   130/82 94 18 98.8 °F (37.1 °C) 97 %      MAP       98         Physical Exam    Nursing note and vitals reviewed.  Constitutional: He appears well-developed and well-nourished. He is not diaphoretic. No  distress.   HENT:   Head: Normocephalic and atraumatic.   Eyes: EOM are normal. Pupils are equal, round, and reactive to light. No scleral icterus.   Neck: Normal range of motion. Neck supple. No JVD present.   Cardiovascular: Normal rate and regular rhythm.   Pulmonary/Chest: Breath sounds normal. No respiratory distress.   Abdominal: Soft. There is no tenderness.   Musculoskeletal: Normal range of motion. He exhibits no edema or tenderness.   Neurological: He is alert.   Skin: Skin is warm and dry. No rash noted.   Psychiatric:   Flat affect.  Paranoid thought content.  Acknowledges auditory hallucinations         ED Course   Procedures  Labs Reviewed   CBC W/ AUTO DIFFERENTIAL - Abnormal; Notable for the following:        Result Value    RBC 4.42 (*)     Hemoglobin 13.8 (*)     Hematocrit 39.9 (*)     MCH 31.2 (*)     All other components within normal limits   URINALYSIS - Abnormal; Notable for the following:     Appearance, UA Hazy (*)     All other components within normal limits   ACETAMINOPHEN LEVEL - Abnormal; Notable for the following:     Acetaminophen (Tylenol), Serum <3.0 (*)     All other components within normal limits   COMPREHENSIVE METABOLIC PANEL   TSH   DRUG SCREEN PANEL, URINE EMERGENCY   ALCOHOL,MEDICAL (ETHANOL)             Medical Decision Making:   History:   Old Medical Records: I decided to obtain old medical records.  Differential Diagnosis:   Psychosis  Intoxication  Medication noncompliance    Clinical Tests:   Lab Tests: Ordered and Reviewed  ED Management:  Patient paranoid and agitated at time of history and physical and required sedation.  Labs within acceptable limits without significant electrolyte abnormality.  Return toxicology positive for amphetamine.  Patient appears to be psychotic a danger to himself and others.  He is placed under PEC.  He is hemodynamically stable and medically cleared for transfer to psychiatric facility for further management.                       Clinical Impression:   The encounter diagnosis was Psychosis, unspecified psychosis type.    Disposition:   Disposition: Discharged  Condition: Stable                        Ary Paz MD  04/14/18 0732

## 2018-04-14 NOTE — ED TRIAGE NOTES
"Pt to ED by way of  police. Pt noted with increased paranoia and verbalizes hallucinations. Pt states the voices he hears is telling him that they are going to hurt him. Pt states the voices are "fucking with his head". Verbalizes he took a bunch of Seroquel approximately one month ago with the intent to hurt himself. On admit pt is noted with increased paranoia. Pt denies homicidal/suicidal intentions when asked. Pt thinks the hospital security staff is here to harm him and pt appears fearful/scared/nail biting.   "